# Patient Record
Sex: FEMALE | Race: AMERICAN INDIAN OR ALASKA NATIVE | NOT HISPANIC OR LATINO | Employment: STUDENT | URBAN - METROPOLITAN AREA
[De-identification: names, ages, dates, MRNs, and addresses within clinical notes are randomized per-mention and may not be internally consistent; named-entity substitution may affect disease eponyms.]

---

## 2017-01-03 ENCOUNTER — OFFICE VISIT (OUTPATIENT)
Dept: OPTOMETRY | Facility: CLINIC | Age: 22
End: 2017-01-03
Payer: COMMERCIAL

## 2017-01-03 ENCOUNTER — APPOINTMENT (OUTPATIENT)
Dept: OPTOMETRY | Facility: CLINIC | Age: 22
End: 2017-01-03
Payer: COMMERCIAL

## 2017-01-03 DIAGNOSIS — H52.13 MYOPIA, BILATERAL: Primary | ICD-10-CM

## 2017-01-03 DIAGNOSIS — H52.203 ASTIGMATISM, BILATERAL: ICD-10-CM

## 2017-01-03 PROCEDURE — 92015 DETERMINE REFRACTIVE STATE: CPT | Performed by: OPTOMETRIST

## 2017-01-03 PROCEDURE — 92340 FIT SPECTACLES MONOFOCAL: CPT | Performed by: OPTOMETRIST

## 2017-01-03 PROCEDURE — 92004 COMPRE OPH EXAM NEW PT 1/>: CPT | Performed by: OPTOMETRIST

## 2017-01-03 RX ORDER — FAMOTIDINE 20 MG/1
20 TABLET, FILM COATED ORAL
COMMUNITY
Start: 2016-12-15 | End: 2017-06-22

## 2017-01-03 ASSESSMENT — REFRACTION_MANIFEST
OS_CYLINDER: +0.50
OD_SPHERE: -1.50
OD_AXIS: 025
OS_SPHERE: -1.25
OS_AXIS: 125
OD_CYLINDER: +0.75

## 2017-01-03 ASSESSMENT — CONF VISUAL FIELD
OS_NORMAL: 1
OD_NORMAL: 1
METHOD: COUNTING FINGERS

## 2017-01-03 ASSESSMENT — VISUAL ACUITY
OS_SC: 20/20
METHOD: SNELLEN - LINEAR
OD_SC: 20/20
OS_SC+: -1
OS_SC: 20/20
OD_SC: 20/30

## 2017-01-03 ASSESSMENT — TONOMETRY
OS_IOP_MMHG: 15
OD_IOP_MMHG: 16
IOP_METHOD: TONOPEN

## 2017-01-03 ASSESSMENT — SLIT LAMP EXAM - LIDS
COMMENTS: NORMAL
COMMENTS: NORMAL

## 2017-01-03 ASSESSMENT — EXTERNAL EXAM - LEFT EYE: OS_EXAM: NORMAL

## 2017-01-03 ASSESSMENT — EXTERNAL EXAM - RIGHT EYE: OD_EXAM: NORMAL

## 2017-01-03 NOTE — MR AVS SNAPSHOT
After Visit Summary   1/3/2017    Anjelica Ocampo    MRN: [id number]           Patient Information     Date Of Birth          1995        Visit Information        Provider Department      1/3/2017 11:20 AM Pollo Cardoza, LIS [facility]        Today's Diagnoses     Myopia, bilateral    -  1     Astigmatism, bilateral           Care Instructions    Recommend new glasses.    Return in 1 year for a complete eye exam or sooner if needed.    Pollo Cardoza, OD    The affects of the dilating drops last for 4- 6 hours.  You will be more sensitive to light and vision will be blurry up close.  Mydriatic sunglasses were given if needed.            Follow-ups after your visit        Your next 10 appointments already scheduled     Jan 03, 2017  1:15 PM   New Visit with [facility]   [facility] ([facility]    [address] [place] [id number] [phone]              Who to contact     If you have questions or need follow up information about today's clinic visit or your schedule please contact [facility] directly at [phone].  Normal or non-critical lab and imaging results will be communicated to you by MyChart, letter or phone within 4 business days after the clinic has received the results. If you do not hear from us within 7 days, please contact the clinic through [employer] or phone. If you have a critical or abnormal lab result, we will notify you by phone as soon as possible.  Submit refill requests through [employer] or call your pharmacy and they will forward the refill request to us. Please allow 3 business days for your refill to be completed.          Additional Information About Your Visit        MyChart Information     [employer] lets you send messages to your doctor, view your test results, renew your prescriptions, schedule appointments and more. To sign up, go to [email or web address] .  "Click on \"Log in\" on the left side of the screen, which will take you to the Welcome page. Then click on \"Sign up Now\" on the right side of the page.     You will be asked to enter the access code listed below, as well as some personal information. Please follow the directions to create your username and password.     Your access code is: [email or web address]  Expires: 2017  4:14 PM     Your access code will  in 90 days. If you need help or a new code, please call your [place] clinic or [phone].        Care EveryWhere ID     This is your Care EveryWhere ID. This could be used by other organizations to access your [place] medical records  [id number]        Your Vitals Were     Last Period                   (LMP Unknown)            Blood Pressure from Last 3 Encounters:   10/09/16 122/71   01/10/14 114/72   13 112/72    Weight from Last 3 Encounters:   10/09/16 62.596 kg (138 lb)   01/10/14 54.091 kg (119 lb 4 oz) (42.90 %*)   13 58.968 kg (130 lb) (65.71 %*)     * Growth percentiles are based on [facility] 2-20 Years data.              We Performed the Following     EYE EXAM (SIMPLE-NONBILLABLE)     REFRACTION        Primary Care Provider Office Phone # Fax #    Debora Reza [phone]       XXX RETIRED XXX [address]  [facility] [address]        Thank you!     Thank you for choosing [facility]  for your care. Our goal is always to provide you with excellent care. Hearing back from our patients is one way we can continue to improve our services. Please take a few minutes to complete the written survey that you may receive in the mail after your visit with us. Thank you!             Your Updated Medication List - Protect others around you: Learn how to safely use, store and throw away your medicines at www.disposemymeds.org.          This list is accurate as of: 1/3/17  1:14 PM.  Always use your most recent med list.                   Brand Name Dispense " Instructions for use    [employer] PRENATAL GUMMY PO      Take by mouth daily       famotidine 20 MG tablet    PEPCID     Take 20 mg by mouth

## 2017-01-03 NOTE — PROGRESS NOTES
Chief Complaint   Patient presents with     COMPREHENSIVE EYE EXAM      Accompanied by mother and Accompanied by brother  Last Eye Exam: 2014  Dilated Previously: Yes    What are you currently using to see?  does not use glasses or contacts       Distance Vision Acuity: Noticed gradual change in both eyes    Near Vision Acuity: Not satisfied     Eye Comfort: good  Do you use eye drops? : No  Occupation or Hobbies: none    Salina Grullon Optometric Assistant, A.B.O.C.         Medical, surgical and family histories reviewed and updated 1/3/2017.       OBJECTIVE: See Ophthalmology exam    ASSESSMENT:    ICD-10-CM    1. Myopia, bilateral H52.13 EYE EXAM (SIMPLE-NONBILLABLE)     REFRACTION   2. Astigmatism, bilateral H52.203 EYE EXAM (SIMPLE-NONBILLABLE)     REFRACTION      PLAN:     Patient Instructions   Recommend new glasses.    Return in 1 year for a complete eye exam or sooner if needed.    Pollo Cardoza, OD

## 2017-01-03 NOTE — PATIENT INSTRUCTIONS
Recommend new glasses.    Return in 1 year for a complete eye exam or sooner if needed.    Pollo Cardoza, OD    The affects of the dilating drops last for 4- 6 hours.  You will be more sensitive to light and vision will be blurry up close.  Mydriatic sunglasses were given if needed.

## 2017-06-22 ENCOUNTER — OFFICE VISIT (OUTPATIENT)
Dept: URGENT CARE | Facility: URGENT CARE | Age: 22
End: 2017-06-22
Payer: COMMERCIAL

## 2017-06-22 VITALS
OXYGEN SATURATION: 99 % | DIASTOLIC BLOOD PRESSURE: 64 MMHG | SYSTOLIC BLOOD PRESSURE: 121 MMHG | TEMPERATURE: 98.1 F | HEART RATE: 72 BPM | BODY MASS INDEX: 25.81 KG/M2 | WEIGHT: 148 LBS

## 2017-06-22 DIAGNOSIS — R51.9 ACUTE INTRACTABLE HEADACHE, UNSPECIFIED HEADACHE TYPE: Primary | ICD-10-CM

## 2017-06-22 DIAGNOSIS — R11.14 BILIOUS VOMITING WITH NAUSEA: ICD-10-CM

## 2017-06-22 DIAGNOSIS — Z32.01 PREGNANCY TEST POSITIVE: ICD-10-CM

## 2017-06-22 DIAGNOSIS — G56.22 NEURITIS OF LEFT ULNAR NERVE: ICD-10-CM

## 2017-06-22 PROCEDURE — 99215 OFFICE O/P EST HI 40 MIN: CPT | Performed by: PHYSICIAN ASSISTANT

## 2017-06-22 NOTE — MR AVS SNAPSHOT
"              After Visit Summary   2017    Anjelica Ocampo    MRN: [id number]           Patient Information     Date Of Birth          1995        Visit Information        Provider Department      2017 6:05 PM Cheli Whitehead PA-C [facility]        Today's Diagnoses     Acute intractable headache, unspecified headache type    -  1    Bilious vomiting with nausea        Pregnancy test positive        Neuritis of left ulnar nerve           Follow-ups after your visit        Who to contact     If you have questions or need follow up information about today's clinic visit or your schedule please contact [facility] directly at [phone].  Normal or non-critical lab and imaging results will be communicated to you by MyChart, letter or phone within 4 business days after the clinic has received the results. If you do not hear from us within 7 days, please contact the clinic through [employer] or phone. If you have a critical or abnormal lab result, we will notify you by phone as soon as possible.  Submit refill requests through [employer] or call your pharmacy and they will forward the refill request to us. Please allow 3 business days for your refill to be completed.          Additional Information About Your Visit        MyChart Information     [employer] lets you send messages to your doctor, view your test results, renew your prescriptions, schedule appointments and more. To sign up, go to [email or web address] . Click on \"Log in\" on the left side of the screen, which will take you to the Welcome page. Then click on \"Sign up Now\" on the right side of the page.     You will be asked to enter the access code listed below, as well as some personal information. Please follow the directions to create your username and password.     Your access code is: [email or web address]  Expires: 2017  7:54 PM     Your access code will  in 90 days. If you need help " or a new code, please call your [place] clinic or [phone].        Care EveryWhere ID     This is your Care EveryWhere ID. This could be used by other organizations to access your [place] medical records  [id number]        Your Vitals Were     Pulse Temperature Last Period Pulse Oximetry Breastfeeding? BMI (Body Mass Index)    72 98.1  F (36.7  C) (Oral) (LMP Unknown) 99% No 25.81 kg/m2       Blood Pressure from Last 3 Encounters:   06/22/17 121/64   10/09/16 122/71   01/10/14 114/72    Weight from Last 3 Encounters:   06/22/17 148 lb (67.1 kg)   10/09/16 138 lb (62.6 kg)   01/10/14 119 lb 4 oz (54.1 kg) (43 %)*     * Growth percentiles are based on [facility] 2-20 Years data.              Today, you had the following     No orders found for display       Primary Care Provider Office Phone # Fax #    Debora Reza [phone]       XXX RETIRED XXX [address]  [place] [address]        Equal Access to Services     [facility] : Hadii cyn camarena Soac, waaxda lualissa, qaybta kaalru bess, pamella lucas . So [facility] [phone].    ATENCIÓN: Si habla español, tiene a cardoso disposición servicios gratuitos de asistencia lingüística. [facility] [phone].    We comply with applicable federal civil rights laws and [place] laws. We do not discriminate on the basis of race, color, national origin, age, disability sex, sexual orientation or gender identity.            Thank you!     Thank you for choosing [facility]  for your care. Our goal is always to provide you with excellent care. Hearing back from our patients is one way we can continue to improve our services. Please take a few minutes to complete the written survey that you may receive in the mail after your visit with us. Thank you!             Your Updated Medication List - Protect others around you: Learn how to safely use, store and throw away your medicines at  www.disposemymeds.org.      Notice  As of 6/22/2017  7:54 PM    You have not been prescribed any medications.

## 2017-06-22 NOTE — PROGRESS NOTES
SUBJECTIVE:                                                    Anjelica Ocampo is a 21 year old female who presents to clinic today for the following health issues:    Headaches      Duration: 2-3 weeks    Description  Location: left side of head  Character: burning sensation  Frequency:    Duration:      Intensity:  moderate    Accompanying signs and symptoms: sleeping less, fatigue, exhaustion, lightheaded    Precipitating or Alleviating factors: pt states that she took an at-home pregnancy test and it came back positive.   Nausea/vomiting: YES  Dizziness: usually  Weakness or numbness: no  Visual changes: none  Fever: no   Sinus or URI symptoms no     History  Head trauma: no   Family history of migraines: YES- mother.   Previous tests for headaches: no   Neurologist evaluations: no   Able to do daily activities when headache present: no   Wake with headaches: YES  Daily pain medication use: no   Any changes in:     Precipitating or Alleviating factors (light/sound/sleep/caffeine): light    Therapies tried and outcome: ibuprofen    Outcome - little relief.  Frequent/daily pain medication use: no       SUBJECTIVE:  Anjelica Ocampo is a 21 year old female who comes in for evaluation of headache.     DESCRIPTION OF HEADACHE:     Duration: 2-3 weeks    Description  Location: left side of head  Character: burning sensation  Frequency:    Duration:      Intensity:  moderate    Accompanying signs and symptoms: sleeping less, fatigue, exhaustion, lightheaded  Time from onset to maximum pain 1.5 weeks   This is the worst headache she has ever had.     Started with headache and vomiting 5-6 times/day.  Yellow foamy vomiting.  No abdominal pain. Normally does not get headaches.   Took a positive pregnancy test last week, not sure of gestational ago.  Planning to schedule first OB visit soon.   She has cold and sweats, burning on left side of head.  Numbness in right and small fingers when she stands up  She  is sleeping a lot. Has child at home who is 4 months old, so sleep is challenge.      Recent change in drug, medication or caffeine use: NO    Fever, myalgias, or neck stiffness: YES: body aches and weakness    Altered mental status, weakness, facial droop, or slurred speech: YES: slurring and mixing her words up, mumbling   Dizziness: YES    Trauma: NO    Multiple family members with headache or morning headache: NO    Exposure to carbon monoxide? NO   Anticoagulants or clotting disorder: NO    Jaw claudication or temple pain: YES: and face feels swollen    Recent exercise or chiropractic adjustment: NO    Pregnant or postpartum: YES    Aura or precipitants: none   Change in smell: YES    Change in vision: YES    Accompanying symptoms: nausea, vomiting, sonophobia, photophobia and paresthesias bilateral fingers.      History of headaches: Rarely, associated with astigmatism   Are most headaches similar in presentation? NO    HA medications:   Abortive meds? NSAIDs (ibuprofen)    Daily use? NO   Prophylactic meds? None      Past Medical History:   Diagnosis Date     Chronic gastritis      No current outpatient prescriptions on file.     Social History   Substance Use Topics     Smoking status: Passive Smoke Exposure - Never Smoker     Smokeless tobacco: Never Used      Comment: mom smokes      Alcohol use 0.5 oz/week     1 Shots of liquor per week     ROS:  As in HPI    OBJECTIVE:  /64 (BP Location: Left arm, Patient Position: Chair, Cuff Size: Adult Regular)  Pulse 72  Temp 98.1  F (36.7  C) (Oral)  Wt 148 lb (67.1 kg)  LMP  (LMP Unknown)  SpO2 99%  Breastfeeding? No  BMI 25.81 kg/m2    Exam:  CRANIAL NERVES:  Pupils equal and reacting to light  EYES: Normal  EOMI  Normal face sensation  Normal corneal reflex  Normal face strength and symmetry  Hearing - slightly diminished on left   Normal swallowing  Uvula midline  Full trapezius / sternocleidomastoid strength  Normal tongue protrusion     Neurologic  Exam:  Gait: Normal. Intact toe-heel walk  Strength: 5/5 deltoid, biceps, triceps, , hip flexion, knee flexion, dorsiflexion, plantarflexion  Visual fields intact: right, left and bilateral  Pronator drift: negative  Rapid hand movements intact  Finger to nose intact  DTR equal bilaterally: biceps, triceps, patellar, achilles  Sensation intact toes and shoulders    GENERAL APPEARANCE: healthy, alert and no distress  HENT: TM fluid bilateral, grey and translucent   NECK: supple, nontender, no lymphadenopathy  RESP: lungs clear to auscultation - no rales, rhonchi or wheezes  CV: regular rates and rhythm, normal S1 S2, no murmur noted  ABDOMEN:  soft, nontender, no masses  SKIN: no suspicious lesions or rashes  EXTREMITIES: positive ulnar Tinel's left, negative right.  Equivocal ulnar scratch test left, negative right.  Decreased sensation bilateral small and ring fingers.       ASSESSMENT/PLAN:  1. Acute intractable headache, unspecified headache type  2. Bilious vomiting with nausea  3. Pregnancy test positive  4. Neuritis of left ulnar nerve    - I am concerned with the slurred speech and bilious vomiting.   - I sent her to the emergency room for further evaluation.  I called [facility] with a handoff.     Cheli Whitehead PA-C

## 2017-06-22 NOTE — NURSING NOTE
"Chief Complaint   Patient presents with     Headache     Pt c/o headaches and vomiting for 2-3 weeks.        Initial /64 (BP Location: Left arm, Patient Position: Chair, Cuff Size: Adult Regular)  Pulse 72  Temp 98.1  F (36.7  C) (Oral)  Wt 148 lb (67.1 kg)  LMP  (LMP Unknown)  SpO2 99%  Breastfeeding? No  BMI 25.81 kg/m2 Estimated body mass index is 25.81 kg/(m^2) as calculated from the following:    Height as of 1/10/14: 5' 3.5\" (1.613 m).    Weight as of this encounter: 148 lb (67.1 kg).  Medication Reconciliation: complete     Patria New CMA (AAMA)      "

## 2017-09-23 ENCOUNTER — HEALTH MAINTENANCE LETTER (OUTPATIENT)
Age: 22
End: 2017-09-23

## 2017-10-30 LAB
HBV SURFACE AG SERPL QL IA: NONREACTIVE
HIV 1+2 AB+HIV1 P24 AG SERPL QL IA: NONREACTIVE
RUBELLA ABY IGG: NORMAL

## 2017-12-12 LAB — GLU GEST SCREEN 1HR 50G: 138

## 2017-12-26 LAB — HEMOGLOBIN: 10.7 G/DL (ref 11.7–15.7)

## 2017-12-29 LAB — GROUP B STREP PCR: NEGATIVE

## 2017-12-30 ENCOUNTER — HEALTH MAINTENANCE LETTER (OUTPATIENT)
Age: 22
End: 2017-12-30

## 2018-01-09 ENCOUNTER — HOSPITAL ENCOUNTER (OUTPATIENT)
Facility: CLINIC | Age: 23
Discharge: HOME | End: 2018-01-09
Attending: OBSTETRICS & GYNECOLOGY | Admitting: OBSTETRICS & GYNECOLOGY
Payer: COMMERCIAL

## 2018-01-09 VITALS
RESPIRATION RATE: 16 BRPM | SYSTOLIC BLOOD PRESSURE: 120 MMHG | BODY MASS INDEX: 26.63 KG/M2 | DIASTOLIC BLOOD PRESSURE: 69 MMHG | HEIGHT: 64 IN | TEMPERATURE: 97.8 F | WEIGHT: 156 LBS

## 2018-01-09 DIAGNOSIS — Z36.89 ENCOUNTER FOR TRIAGE IN PREGNANT PATIENT: Primary | ICD-10-CM

## 2018-01-09 LAB
A1 MICROGLOB PLACENTAL VAG QL: NEGATIVE
ALBUMIN UR-MCNC: 30 MG/DL
APPEARANCE UR: ABNORMAL
BACTERIA #/AREA URNS HPF: ABNORMAL /HPF
BILIRUB UR QL STRIP: NEGATIVE
C TRACH DNA SPEC QL NAA+PROBE: NEGATIVE
COLOR UR AUTO: YELLOW
GLUCOSE UR STRIP-MCNC: NEGATIVE MG/DL
HGB UR QL STRIP: NEGATIVE
KETONES UR STRIP-MCNC: 5 MG/DL
LEUKOCYTE ESTERASE UR QL STRIP: ABNORMAL
MUCOUS THREADS #/AREA URNS LPF: PRESENT /LPF
N GONORRHOEA DNA SPEC QL NAA+PROBE: NEGATIVE
NITRATE UR QL: NEGATIVE
PH UR STRIP: 6 PH (ref 5–7)
RBC #/AREA URNS AUTO: 3 /HPF (ref 0–2)
SOURCE: ABNORMAL
SP GR UR STRIP: 1.02 (ref 1–1.03)
SPECIMEN SOURCE: NORMAL
SQUAMOUS #/AREA URNS AUTO: 19 /HPF (ref 0–1)
TRANS CELLS #/AREA URNS HPF: 2 /HPF (ref 0–1)
UROBILINOGEN UR STRIP-MCNC: NORMAL MG/DL (ref 0–2)
WBC #/AREA URNS AUTO: 137 /HPF (ref 0–2)
WET PREP SPEC: NORMAL

## 2018-01-09 PROCEDURE — 87491 CHLMYD TRACH DNA AMP PROBE: CPT | Performed by: STUDENT IN AN ORGANIZED HEALTH CARE EDUCATION/TRAINING PROGRAM

## 2018-01-09 PROCEDURE — 87591 N.GONORRHOEAE DNA AMP PROB: CPT | Performed by: STUDENT IN AN ORGANIZED HEALTH CARE EDUCATION/TRAINING PROGRAM

## 2018-01-09 PROCEDURE — G0463 HOSPITAL OUTPT CLINIC VISIT: HCPCS

## 2018-01-09 PROCEDURE — 84112 EVAL AMNIOTIC FLUID PROTEIN: CPT | Performed by: OBSTETRICS & GYNECOLOGY

## 2018-01-09 PROCEDURE — 87086 URINE CULTURE/COLONY COUNT: CPT | Performed by: OBSTETRICS & GYNECOLOGY

## 2018-01-09 PROCEDURE — 81001 URINALYSIS AUTO W/SCOPE: CPT | Performed by: OBSTETRICS & GYNECOLOGY

## 2018-01-09 PROCEDURE — 87210 SMEAR WET MOUNT SALINE/INK: CPT | Performed by: STUDENT IN AN ORGANIZED HEALTH CARE EDUCATION/TRAINING PROGRAM

## 2018-01-09 RX ORDER — HYDROXYZINE PAMOATE 50 MG/1
50-100 CAPSULE ORAL
Qty: 10 CAPSULE | Refills: 0 | Status: SHIPPED | OUTPATIENT
Start: 2018-01-09 | End: 2022-11-06

## 2018-01-09 NOTE — PLAN OF CARE
Cervix unchanged upon recheck. Pt continues to have mild abdominal cramping, see flowsheets for FHR and contraction patterns. Prescription given for vistaril, to be picked up at hospital pharmacy by pt. Discharge instructions given with instructions on setting up clinic appointment with [facility] due to pt desiring to transfer care to deliver here. Pt denies needs, concerns, or questions at this time. Discharged ambulatory 0225.

## 2018-01-09 NOTE — IP AVS SNAPSHOT
MRN:[id number]                      After Visit Summary   1/9/2018    Anjelica Ocampo    MRN: [id number]           Thank you!     Thank you for choosing [facility] for your care. Our goal is always to provide you with excellent care. Hearing back from our patients is one way we can continue to improve our services. Please take a few minutes to complete the written survey that you may receive in the mail after you visit with us. Thank you!        Patient Information     Date Of Birth          1995        Designated Caregiver       Most Recent Value    Caregiver    Will someone help with your care after discharge? no      About your hospital stay     You were admitted on:  January 9, 2018 You last received care in the:  [facility]    You were discharged on:  January 9, 2018       Who to Call     For medical emergencies, please call 911.  For non-urgent questions about your medical care, please call your primary care provider or clinic, None          Attending Provider     Provider Shirley Shin MD OB/Gyn       Primary Care Provider Fax #    Physician No Ref-Primary [phone]      Further instructions from your care team       Discharge Instruction for Undelivered Patients      You were seen for: abdominal cramping / pain  We Consulted: Dr. Bolden and Dr. Johnson  You had (Test or Medicine): fetal and uterine monitoring, labs     Diet:   Drink 8 to 12 glasses of liquids (milk, juice, water) every day.  You may eat meals and snacks.     Activity:  Count fetal kicks everyday (see handout)  Call your doctor or nurse midwife if your baby is moving less than usual.     Call your provider if you notice:  Swelling in your face or increased swelling in your hands or legs.  Headaches that are not relieved by Tylenol (acetaminophen).  Changes in your vision (blurring: seeing spots or stars.)  Nausea (sick to your stomach) and vomiting (throwing up).   Weight gain of 5 pounds  "or more per week.  Heartburn that doesn't go away.  Signs of bladder infection: pain when you urinate (use the toilet), need to go more often and more urgently.  The bag of lozano (rupture of membranes) breaks, or you notice leaking in your underwear.  Bright red blood in your underwear.  Abdominal (lower belly) or stomach pain.  Second (plus) baby: Contractions (tightening) less than 10 minutes apart and getting stronger.  Increase or change in vaginal discharge (note the color and amount)      Follow-up:  Call to make clinic appointment with [facility] at [phone]          Pending Results     Date and Time Order Name Status Description    1/9/2018 0038 Chlamydia trachomatis PCR In process     1/9/2018 0038 Neisseria gonorrhoea PCR In process     1/9/2018 0035 Urine Culture Aerobic Bacterial In process             Statement of Approval     Ordered          01/09/18 0218  I have reviewed and agree with all the recommendations and orders detailed in this document.  EFFECTIVE NOW     Approved and electronically signed by:  Aida Bolden MD             Admission Information     Date & Time Provider Department Dept. Phone    1/9/2018 Shirley Johnson MD [facility] [phone]      Your Vitals Were     Blood Pressure Temperature Respirations Height Weight Last Period    120/69 97.8  F (36.6  C) (Oral) 16 1.613 m (5' 3.5\") 70.8 kg (156 lb) (LMP Unknown)    BMI (Body Mass Index)                   27.2 kg/m2           MyChart Information     [employer] lets you send messages to your doctor, view your test results, renew your prescriptions, schedule appointments and more. To sign up, go to [email or web address] . Click on \"Log in\" on the left side of the screen, which will take you to the Welcome page. Then click on \"Sign up Now\" on the right side of the page.     You will be asked to enter the access code listed below, as well as some personal information. Please follow the directions to " create your username and password.     Your access code is: [email or web address]  Expires: 2018  2:20 AM     Your access code will  in 90 days. If you need help or a new code, please call your [place] clinic or [phone].        Care EveryWhere ID     This is your Care EveryWhere ID. This could be used by other organizations to access your [place] medical records  [id number]        Equal Access to Services     [facility]: Hadii aad ku hadasho Soomaali, waaxda luqadaha, qaybta kaalmada adeegyada, waxay idiin hayaan adeeg stevetiffaniejuan ramon lucas . So [facility] [phone].    ATENCIÓN: Si habla español, tiene a cardoso disposición servicios gratuitos de asistencia lingüística. Nathanielame al [phone].    We comply with applicable federal civil rights laws and [place] laws. We do not discriminate on the basis of race, color, national origin, age, disability, sex, sexual orientation, or gender identity.               Review of your medicines      START taking        Dose / Directions    hydrOXYzine 50 MG capsule   Commonly known as:  VISTARIL        Dose:   mg   Take 1-2 capsules ( mg) by mouth nightly as needed (for sleep)   Quantity:  10 capsule   Refills:  0            Where to get your medicines      Some of these will need a paper prescription and others can be bought over the counter. Ask your nurse if you have questions.     Bring a paper prescription for each of these medications     hydrOXYzine 50 MG capsule                Protect others around you: Learn how to safely use, store and throw away your medicines at www.disposemymeds.org.             Medication List: This is a list of all your medications and when to take them. Check marks below indicate your daily home schedule. Keep this list as a reference.      Medications           Morning Afternoon Evening Bedtime As Needed    hydrOXYzine 50 MG capsule   Commonly known as:  VISTARIL   Take 1-2 capsules ( mg) by mouth nightly as needed (for sleep)

## 2018-01-09 NOTE — DISCHARGE INSTRUCTIONS
Discharge Instruction for Undelivered Patients      You were seen for: abdominal cramping / pain  We Consulted: Dr. Bolden and Dr. Johnson  You had (Test or Medicine): fetal and uterine monitoring, labs     Diet:   Drink 8 to 12 glasses of liquids (milk, juice, water) every day.  You may eat meals and snacks.     Activity:  Count fetal kicks everyday (see handout)  Call your doctor or nurse midwife if your baby is moving less than usual.     Call your provider if you notice:  Swelling in your face or increased swelling in your hands or legs.  Headaches that are not relieved by Tylenol (acetaminophen).  Changes in your vision (blurring: seeing spots or stars.)  Nausea (sick to your stomach) and vomiting (throwing up).   Weight gain of 5 pounds or more per week.  Heartburn that doesn't go away.  Signs of bladder infection: pain when you urinate (use the toilet), need to go more often and more urgently.  The bag of lozano (rupture of membranes) breaks, or you notice leaking in your underwear.  Bright red blood in your underwear.  Abdominal (lower belly) or stomach pain.  Second (plus) baby: Contractions (tightening) less than 10 minutes apart and getting stronger.  Increase or change in vaginal discharge (note the color and amount)      Follow-up:  Call to make clinic appointment with [facility] at [phone]

## 2018-01-09 NOTE — PLAN OF CARE
Data: Patient presented to [facility] at 0003 with complaints of uterine pain and possible leaking of fluid. Patient is a . Prenatal record reviewed.   Obstetric History       T0      L0     SAB1   TAB0   Ectopic0   Multiple0   Live Births0       # Outcome Date GA Lbr Vamsi/2nd Weight Sex Delivery Anes PTL Lv   3 Current            2             1 SAB               .  Medical History:   Past Medical History:   Diagnosis Date     Chronic gastritis    .  Gestational age 38w4d. Vital signs per doc flowsheet. Fetal movement present. Patient reports Rule Out Labor   as reason for admission. Support persons Levi present.  Action: Verbal consent for EFM, external fetal monitors applied. Admission assessment completed. Patient and support persons educated on labor process. Patient instructed to report change in fetal movement, contractions, vaginal leaking of fluid or bleeding, abdominal pain, or any concerns related to the pregnancy to her nurse/physician. Patient oriented to triage, call light in reach.   Response: Dr. Bolden informed of patient arrival. Plan per provider is send GC chlam, wet prep, urine, amnisure and SVE. SVE /-2, was 3.5 in the clinic. Recheck in 2 hours. Patient verbalized understanding of education and verbalized agreement with plan.    Report to VIN Vincent at 0106

## 2018-01-09 NOTE — PROGRESS NOTES
OB Triage Note    CC: Lower abdominal cramping    S: Anjelica Ocampo is a 22 year old  at 38w4d by 28w6d US who presents for lower abdominal cramping and contractions. Reports contractions are occurring every 10 minutes. She is able to talk through them. Reports leaking of clear fluid since yesterday. Reports normal vaginal discharge of pregnancy. Denies vaginal bleeding. Reports good fetal movement.      She received her prenatal care at [facility] but desires to deliver here and is transferring her care. She has not yet set up an appointment for follow up.     Cervix at last check at [facility] was 3cm per patient.     Her pregnancy has been complicated by:   1. Positive antibody screen- per [facility] records, antibody too weak to titer. She will need a type and screen on admission for labor   2. Late presentation for prenatal care  3. Failed GCT but did not complete GTT. Reports she was getting glucose checks in clinic and they have been normal.   4. Polyhydramnios (MANN 25.1). Growth US 37w4d- 6lb 13oz (45%ile)  5. Anemia- reports not taking Iron pills  6. History of homelessness and depression noted in chart, patient denies these.     Prenatal labs (Care Everywhere)  Rh positive, positive antibody screen (antibody to weak to titer)  GBS negative  GCT- failed, did not get GTT  Genetics- presented too late for screening  RPR, Hep B, HIV non-reactive  Rubella immune  Gonorrhea/Chlamydia negative  Pap 10/30/2017: NILM    OB history: ; reports  x1    Past Medical History:   Diagnosis Date     Chronic gastritis      Past Surgical History:   Procedure Laterality Date     NO HISTORY OF SURGERY       Medications  None     No Known Allergies    Objective:  Vitals:    18 0022   BP: 120/69   Temp: 97.8  F (36.6  C)   TempSrc: Oral     General: alert, comfortable, NAD  CV: regular rate and rhythm, no murmurs noted  Lungs: clear bilaterally, no crackles or wheezes  Abdomen: soft, gravid, non-tender  Extremities:  non-tender, trace edema  SSE: normal external genitalia, vaginal walls pink, cervix with normal discharge  SVE: 4/50/-2    FHT: baseline 125, moderate variability, + accelerations. One very subtle early vs late deceleration.  She was observed for half an hour with no further decelerations.   Chestnut: 1 ctx in 10 minutes    Labs/imaging:   Component      Latest Ref Rng & Units 2018          12:30 AM 12:30 AM 12:30 AM   Specimen Description            Wet Prep       No Trichomonas seen No clue cells seen No yeast seen     Component      Latest Ref Rng & Units 2018   Color Urine       Yellow   Appearance Urine       Slightly Cloudy   Glucose Urine      NEG:Negative mg/dL Negative   Bilirubin Urine      NEG:Negative Negative   Ketones Urine      NEG:Negative mg/dL 5 (A)   Specific Gravity Urine      1.003 - 1.035 1.025   Blood Urine      NEG:Negative Negative   pH Urine      5.0 - 7.0 pH 6.0   Protein Albumin Urine      NEG:Negative mg/dL 30 (A)   Urobilinogen mg/dL      0.0 - 2.0 mg/dL Normal   Nitrite Urine      NEG:Negative Negative   Leukocyte Esterase Urine      NEG:Negative Large (A)   Source       Midstream Urine   WBC Urine      0 - 2 / (H)   RBC Urine      0 - 2 /HPF 3 (H)   Bacteria Urine      NEG:Negative /HPF Moderate (A)   Squamous Epithelial /HPF Urine      0 - 1 /HPF 19 (H)   Transitional Epi      0 - 1 /HPF 2 (H)   Mucous Urine      NEG:Negative /LPF Present (A)     Assessment/Plan: 22 year old  at 38w4d by 28w6d US presenting for contractions. Cervix 4/50/-2, unchanged after 2hr re-check. Category 1 FHT overall (one subtle early vs late deceleration which did not recur with continued monitoring). Vital signs stable, physical exam unremarkable. Wet prep negative for infection. UA with moderate bacteria and leukocyte esterase, however many squamous cells and dirty sample. Since she is asymptomatic, will only treat if culture is positive (pending at the time of  discharge). She was reassured that she is not in labor. Labor precautions were discussed. She was given a prescription for vistaril for sleep.     She was given the phone number to call to make an appointment to establish care with [facility].    Patient seen and care plan discussed under supervision of Dr. Johnson.     Aida Bolden MD   Resident Physician, PGY2  Obstetrics, Gynecology, and Women's Health    The patient was reviewed with Dr. Bolden.  I agree with the above assessment and plan of care.    Shirley Johnson MD, FACOG

## 2018-01-09 NOTE — IP AVS SNAPSHOT
[facility]    [address]    [place] [address]    Phone:  [phone]                                       After Visit Summary   1/9/2018    Anjelica Ocampo    MRN: [id number]           After Visit Summary Signature Page     I have received my discharge instructions, and my questions have been answered. I have discussed any challenges I see with this plan with the nurse or doctor.    ..........................................................................................................................................  Patient/Patient Representative Signature      ..........................................................................................................................................  Patient Representative Print Name and Relationship to Patient    ..................................................               ................................................  Date                                            Time    ..........................................................................................................................................  Reviewed by Signature/Title    ...................................................              ..............................................  Date                                                            Time

## 2018-01-10 LAB
BACTERIA SPEC CULT: NO GROWTH
SPECIMEN SOURCE: NORMAL

## 2019-02-19 ENCOUNTER — ALLIED HEALTH/NURSE VISIT (OUTPATIENT)
Dept: NURSING | Facility: CLINIC | Age: 24
End: 2019-02-19
Payer: COMMERCIAL

## 2019-02-19 DIAGNOSIS — Z11.1 SCREENING EXAMINATION FOR PULMONARY TUBERCULOSIS: Primary | ICD-10-CM

## 2019-02-19 PROCEDURE — 86580 TB INTRADERMAL TEST: CPT

## 2019-02-19 NOTE — PROGRESS NOTES
The patient is asked the following questions today and these are her answers:    -Have you had a mantoux administered in the past 30 days?    No  -Have you had a previous positive Mantoux.  No  -Have you received BCG in the past.  No  -Have you had a live vaccine  (MMR, Varicella, OPV, Yellow Fever) in the last 6 weeks.  No  -Have you had and active  viral or bacterial infection in the past 6 weeks.  No  -Have you received corticosteroids or immunosuppressive agents in the past 6 weeks.  No  -Have you been diagnosed with HIV?  No  -Do you have a maglinancy?  No       Celeste Vigil MA

## 2019-02-21 ENCOUNTER — ALLIED HEALTH/NURSE VISIT (OUTPATIENT)
Dept: NURSING | Facility: CLINIC | Age: 24
End: 2019-02-21
Payer: COMMERCIAL

## 2019-02-21 DIAGNOSIS — Z11.1 SCREENING EXAMINATION FOR PULMONARY TUBERCULOSIS: Primary | ICD-10-CM

## 2019-02-21 LAB
PPDINDURATION: 0 MM (ref 0–5)
PPDREDNESS: 0 MM

## 2019-02-21 PROCEDURE — 99207 ZZC NO CHARGE NURSE ONLY: CPT

## 2019-02-21 NOTE — PROGRESS NOTES
Mantoux result:  Lab Results   Component Value Date    PPDREDNESS 0 02/21/2019    PPDINDURATIO 0 02/21/2019     Mantoux results: No induration.  No swelling.  No redness.    Houston Wu RN

## 2021-02-15 ENCOUNTER — NURSE TRIAGE (OUTPATIENT)
Dept: NURSING | Facility: CLINIC | Age: 26
End: 2021-02-15

## 2021-02-16 ENCOUNTER — VIRTUAL VISIT (OUTPATIENT)
Dept: FAMILY MEDICINE | Facility: CLINIC | Age: 26
End: 2021-02-16
Payer: COMMERCIAL

## 2021-02-16 DIAGNOSIS — Z53.9 NO SHOW: Primary | ICD-10-CM

## 2021-02-16 PROCEDURE — 99207 PR NO SHOW FOR SCHEDULED APPT: CPT | Performed by: NURSE PRACTITIONER

## 2021-02-16 NOTE — TELEPHONE ENCOUNTER
Patient says she has been debating on if she should be seen.  Patient reports she is freezing and sweating at the same time.  Her skin feels sensitive to the touch and she has pain all over.  Patient says heart seems to be racing.  Heart rate during call is 84. Patient does not have a thermometer. Reviewed care advice with caller per RN triage protocol guideline to be evaluated tomorrow via virtual visit.  Caller verbalized understanding and agrees with plan.      COVID 19 Nurse Triage Plan/Patient Instructions    Please be aware that novel coronavirus (COVID-19) may be circulating in the community. If you develop symptoms such as fever, cough, or SOB or if you have concerns about the presence of another infection including coronavirus (COVID-19), please contact your health care provider or visit [email or web address].     Disposition/Instructions    Virtual Visit with provider recommended. Reference Visit Selection Guide.    Thank you for taking steps to prevent the spread of this virus.  o Limit your contact with others.  o Wear a simple mask to cover your cough.  o Wash your hands well and often.    Resources    [facility]: About COVID-19: [email or web address]    CDC: What to Do If You're Sick: www.cdc.gov/coronavirus/2019-ncov/about/steps-when-sick.html    CDC: Ending Home Isolation: www.cdc.gov/coronavirus/2019-ncov/hcp/disposition-in-home-patients.html     CDC: Caring for Someone: www.cdc.gov/coronavirus/2019-ncov/if-you-are-sick/care-for-someone.html     [facility]: Interim Guidance for Hospital Discharge to Home: www.health.[facility].[place]/diseases/coronavirus/hcp/hospdischarge.pdf    [facility] clinical trials (COVID-19 research studies): [email or web address]-trials     Below are the COVID-19 hotlines at the [place] [facility] ([facility]). Interpreters are available.   o For health questions: Call 487-796-0922 or 1-418.279.3052 (7 a.m. to 7 p.m.)  o For questions about  schools and childcare: Call 459-189-8323 or 1-951.784.2788 (7 a.m. to 7 p.m.)                         Reason for Disposition    [1] COVID-19 infection suspected by caller or triager AND [2] mild symptoms (cough, fever, or others) AND [3] no complications or SOB    Additional Information    Negative: SEVERE difficulty breathing (e.g., struggling for each breath, speaks in single words)    Negative: Difficult to awaken or acting confused (e.g., disoriented, slurred speech)    Negative: Bluish (or gray) lips or face now    Negative: Shock suspected (e.g., cold/pale/clammy skin, too weak to stand, low BP, rapid pulse)    Negative: Sounds like a life-threatening emergency to the triager    Negative: SEVERE or constant chest pain or pressure (Exception: mild central chest pain, present only when coughing)    Negative: MODERATE difficulty breathing (e.g., speaks in phrases, SOB even at rest, pulse 100-120)    Negative: [1] Headache AND [2] stiff neck (can't touch chin to chest)    Negative: MILD difficulty breathing (e.g., minimal/no SOB at rest, SOB with walking, pulse <100)    Negative: Chest pain or pressure    Negative: Patient sounds very sick or weak to the triager    Negative: Fever > 103 F (39.4 C)    Negative: [1] Fever > 101 F (38.3 C) AND [2] age > 60    Negative: [1] Fever > 100.0 F (37.8 C) AND [2] bedridden (e.g., nursing home patient, CVA, chronic illness, recovering from surgery)    Negative: [1] HIGH RISK patient (e.g., age > 64 years, diabetes, heart or lung disease, weak immune system) AND [2] new or worsening symptoms    Negative: [1] HIGH RISK patient AND [2] influenza is widespread in the community AND [3] ONE OR MORE respiratory symptoms: cough, sore throat, runny or stuffy nose    Negative: [1] HIGH RISK patient AND [2] influenza exposure within the last 7 days AND [3] ONE OR MORE respiratory symptoms: cough, sore throat, runny or stuffy nose    Negative: Fever present > 3 days (72 hours)     Negative: [1] Fever returns after gone for over 24 hours AND [2] symptoms worse or not improved    Negative: [1] Continuous (nonstop) coughing interferes with work or school AND [2] no improvement using cough treatment per protocol    Protocols used: CORONAVIRUS (COVID-19) DIAGNOSED OR [id number] 1.3

## 2021-07-18 ENCOUNTER — APPOINTMENT (OUTPATIENT)
Dept: CT IMAGING | Facility: CLINIC | Age: 26
End: 2021-07-18
Payer: COMMERCIAL

## 2021-07-18 ENCOUNTER — HOSPITAL ENCOUNTER (EMERGENCY)
Facility: CLINIC | Age: 26
Discharge: HOME | End: 2021-07-19
Attending: EMERGENCY MEDICINE | Admitting: EMERGENCY MEDICINE
Payer: COMMERCIAL

## 2021-07-18 DIAGNOSIS — R41.82 ALTERED MENTAL STATUS, UNSPECIFIED ALTERED MENTAL STATUS TYPE: ICD-10-CM

## 2021-07-18 DIAGNOSIS — F12.90 MARIJUANA SMOKER: ICD-10-CM

## 2021-07-18 DIAGNOSIS — F39 EPISODIC MOOD DISORDER (H): ICD-10-CM

## 2021-07-18 LAB
ALBUMIN SERPL-MCNC: 4.2 G/DL (ref 3.4–5)
ALP SERPL-CCNC: 64 U/L (ref 40–150)
ALT SERPL W P-5'-P-CCNC: 22 U/L (ref 0–50)
ANION GAP SERPL CALCULATED.3IONS-SCNC: 3 MMOL/L (ref 3–14)
AST SERPL W P-5'-P-CCNC: 18 U/L (ref 0–45)
ATRIAL RATE - MUSE: 72 BPM
BASOPHILS # BLD AUTO: 0 10E3/UL (ref 0–0.2)
BASOPHILS NFR BLD AUTO: 0 %
BILIRUB SERPL-MCNC: 0.7 MG/DL (ref 0.2–1.3)
BUN SERPL-MCNC: 8 MG/DL (ref 7–30)
CALCIUM SERPL-MCNC: 8.6 MG/DL (ref 8.5–10.1)
CHLORIDE BLD-SCNC: 121 MMOL/L (ref 94–109)
CO2 SERPL-SCNC: 24 MMOL/L (ref 20–32)
CREAT SERPL-MCNC: 0.9 MG/DL (ref 0.52–1.04)
DIASTOLIC BLOOD PRESSURE - MUSE: NORMAL MMHG
EOSINOPHIL # BLD AUTO: 0 10E3/UL (ref 0–0.7)
EOSINOPHIL NFR BLD AUTO: 0 %
ERYTHROCYTE [DISTWIDTH] IN BLOOD BY AUTOMATED COUNT: 12.6 % (ref 10–15)
GFR SERPL CREATININE-BSD FRML MDRD: 90 ML/MIN/1.73M2
GLUCOSE BLD-MCNC: 103 MG/DL (ref 70–99)
GLUCOSE BLDC GLUCOMTR-MCNC: 93 MG/DL (ref 70–99)
HCG SERPL QL: NEGATIVE
HCT VFR BLD AUTO: 38.8 % (ref 35–47)
HGB BLD-MCNC: 12.9 G/DL (ref 11.7–15.7)
IMM GRANULOCYTES # BLD: 0.1 10E3/UL
IMM GRANULOCYTES NFR BLD: 0 %
INTERPRETATION ECG - MUSE: NORMAL
LYMPHOCYTES # BLD AUTO: 0.9 10E3/UL (ref 0.8–5.3)
LYMPHOCYTES NFR BLD AUTO: 6 %
MCH RBC QN AUTO: 29.5 PG (ref 26.5–33)
MCHC RBC AUTO-ENTMCNC: 33.2 G/DL (ref 31.5–36.5)
MCV RBC AUTO: 89 FL (ref 78–100)
MONOCYTES # BLD AUTO: 0.5 10E3/UL (ref 0–1.3)
MONOCYTES NFR BLD AUTO: 3 %
NEUTROPHILS # BLD AUTO: 14.1 10E3/UL (ref 1.6–8.3)
NEUTROPHILS NFR BLD AUTO: 91 %
NRBC # BLD AUTO: 0 10E3/UL
NRBC BLD AUTO-RTO: 0 /100
P AXIS - MUSE: 57 DEGREES
PLATELET # BLD AUTO: 294 10E3/UL (ref 150–450)
POTASSIUM BLD-SCNC: 3.6 MMOL/L (ref 3.4–5.3)
PR INTERVAL - MUSE: 154 MS
PROT SERPL-MCNC: 8.4 G/DL (ref 6.8–8.8)
QRS DURATION - MUSE: 86 MS
QT - MUSE: 382 MS
QTC - MUSE: 418 MS
R AXIS - MUSE: 52 DEGREES
RBC # BLD AUTO: 4.38 10E6/UL (ref 3.8–5.2)
SODIUM SERPL-SCNC: 148 MMOL/L (ref 133–144)
SYSTOLIC BLOOD PRESSURE - MUSE: NORMAL MMHG
T AXIS - MUSE: 38 DEGREES
TSH SERPL DL<=0.005 MIU/L-ACNC: 2.69 MU/L (ref 0.4–4)
VENTRICULAR RATE- MUSE: 72 BPM
WBC # BLD AUTO: 15.6 10E3/UL (ref 4–11)

## 2021-07-18 PROCEDURE — 250N000011 HC RX IP 250 OP 636: Performed by: STUDENT IN AN ORGANIZED HEALTH CARE EDUCATION/TRAINING PROGRAM

## 2021-07-18 PROCEDURE — 70450 CT HEAD/BRAIN W/O DYE: CPT

## 2021-07-18 PROCEDURE — 99285 EMERGENCY DEPT VISIT HI MDM: CPT | Mod: 25

## 2021-07-18 PROCEDURE — 36592 COLLECT BLOOD FROM PICC: CPT | Performed by: EMERGENCY MEDICINE

## 2021-07-18 PROCEDURE — 250N000013 HC RX MED GY IP 250 OP 250 PS 637: Performed by: EMERGENCY MEDICINE

## 2021-07-18 PROCEDURE — 85025 COMPLETE CBC W/AUTO DIFF WBC: CPT | Performed by: STUDENT IN AN ORGANIZED HEALTH CARE EDUCATION/TRAINING PROGRAM

## 2021-07-18 PROCEDURE — C9803 HOPD COVID-19 SPEC COLLECT: HCPCS

## 2021-07-18 PROCEDURE — 93005 ELECTROCARDIOGRAM TRACING: CPT

## 2021-07-18 PROCEDURE — 36415 COLL VENOUS BLD VENIPUNCTURE: CPT | Performed by: EMERGENCY MEDICINE

## 2021-07-18 PROCEDURE — 80053 COMPREHEN METABOLIC PANEL: CPT | Performed by: STUDENT IN AN ORGANIZED HEALTH CARE EDUCATION/TRAINING PROGRAM

## 2021-07-18 PROCEDURE — U0003 INFECTIOUS AGENT DETECTION BY NUCLEIC ACID (DNA OR RNA); SEVERE ACUTE RESPIRATORY SYNDROME CORONAVIRUS 2 (SARS-COV-2) (CORONAVIRUS DISEASE [COVID-19]), AMPLIFIED PROBE TECHNIQUE, MAKING USE OF HIGH THROUGHPUT TECHNOLOGIES AS DESCRIBED BY CMS-2020-01-R: HCPCS | Performed by: EMERGENCY MEDICINE

## 2021-07-18 PROCEDURE — 84703 CHORIONIC GONADOTROPIN ASSAY: CPT | Performed by: EMERGENCY MEDICINE

## 2021-07-18 PROCEDURE — 84443 ASSAY THYROID STIM HORMONE: CPT | Performed by: STUDENT IN AN ORGANIZED HEALTH CARE EDUCATION/TRAINING PROGRAM

## 2021-07-18 PROCEDURE — 250N000011 HC RX IP 250 OP 636: Performed by: EMERGENCY MEDICINE

## 2021-07-18 RX ORDER — HALOPERIDOL 5 MG/ML
10 INJECTION INTRAMUSCULAR ONCE
Status: COMPLETED | OUTPATIENT
Start: 2021-07-18 | End: 2021-07-18

## 2021-07-18 RX ORDER — ONDANSETRON 8 MG/1
8 TABLET, ORALLY DISINTEGRATING ORAL ONCE
Status: COMPLETED | OUTPATIENT
Start: 2021-07-18 | End: 2021-07-18

## 2021-07-18 RX ORDER — ONDANSETRON 4 MG/1
4 TABLET, ORALLY DISINTEGRATING ORAL ONCE
Status: DISCONTINUED | OUTPATIENT
Start: 2021-07-18 | End: 2021-07-18

## 2021-07-18 RX ORDER — HYDROXYZINE HYDROCHLORIDE 25 MG/1
50 TABLET, FILM COATED ORAL ONCE
Status: COMPLETED | OUTPATIENT
Start: 2021-07-18 | End: 2021-07-18

## 2021-07-18 RX ADMIN — HYDROXYZINE HYDROCHLORIDE 50 MG: 25 TABLET, FILM COATED ORAL at 17:32

## 2021-07-18 RX ADMIN — HALOPERIDOL LACTATE 10 MG: 5 INJECTION, SOLUTION INTRAMUSCULAR at 18:06

## 2021-07-18 RX ADMIN — ONDANSETRON 8 MG: 8 TABLET, ORALLY DISINTEGRATING ORAL at 17:26

## 2021-07-18 ASSESSMENT — ENCOUNTER SYMPTOMS
ABDOMINAL PAIN: 0
FEVER: 0
BACK PAIN: 0
SORE THROAT: 1
CHILLS: 0
HALLUCINATIONS: 0
NUMBNESS: 1
SHORTNESS OF BREATH: 0

## 2021-07-18 NOTE — ED NOTES
Patient at this time began to speak to RN and security, she was not coherent, words were coming out of patient's mouth, but she was unable to use her words to express what was occurring.  At this time she was walking towards the exit (straight, no issues with mobility) and attempting to leave. She was reminded that she is in the hospital and needs to be checked, but continued to attempt to leave the ED. At this time security brought patient to the ground for attempting to elope, and was brought into seclusion after shot was given to patient IM. She was standing at the door at this time with 1:1 present. She has taken off her clothing and has stripped naked, for patient modesty 1:1 will remain a female. Covering has been placed over windows to give patient modesty, but allows 1:1 to still observe patient.

## 2021-07-18 NOTE — ED NOTES
Within an hour after restraint an in person face to face assessment was completed at 6:19 PM, including an evaluation of the patient's immediate reaction to the intervention, behavioral assessment and review/assessment of history, drugs and medications, recent labs, etc., and behavioral condition.  The patient experienced: No adverse physical outcome from seclusion initiation.  The intervention of restraint or seclusion needs to continue.       Apoorva Suarez MD  Resident  07/18/21 [id number]
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
## 2021-07-19 VITALS
SYSTOLIC BLOOD PRESSURE: 137 MMHG | TEMPERATURE: 97 F | OXYGEN SATURATION: 99 % | DIASTOLIC BLOOD PRESSURE: 81 MMHG | WEIGHT: 141.7 LBS | BODY MASS INDEX: 24.71 KG/M2 | RESPIRATION RATE: 16 BRPM | HEART RATE: 79 BPM

## 2021-07-19 LAB
ALBUMIN UR-MCNC: NEGATIVE MG/DL
AMPHETAMINES UR QL SCN: ABNORMAL
APPEARANCE UR: ABNORMAL
BACTERIA #/AREA URNS HPF: ABNORMAL /HPF
BARBITURATES UR QL: ABNORMAL
BENZODIAZ UR QL: ABNORMAL
BILIRUB UR QL STRIP: NEGATIVE
CANNABINOIDS UR QL SCN: ABNORMAL
COCAINE UR QL: ABNORMAL
COLOR UR AUTO: ABNORMAL
ETHANOL UR QL SCN: ABNORMAL
GLUCOSE UR STRIP-MCNC: NEGATIVE MG/DL
HGB UR QL STRIP: ABNORMAL
KETONES UR STRIP-MCNC: 40 MG/DL
LEUKOCYTE ESTERASE UR QL STRIP: NEGATIVE
MUCOUS THREADS #/AREA URNS LPF: PRESENT /LPF
NITRATE UR QL: NEGATIVE
OPIATES UR QL SCN: ABNORMAL
PH UR STRIP: 5.5 [PH] (ref 5–7)
RBC URINE: 3 /HPF
RENAL TUB EPI: <1 /HPF
SARS-COV-2 RNA RESP QL NAA+PROBE: NEGATIVE
SP GR UR STRIP: 1.02 (ref 1–1.03)
SQUAMOUS EPITHELIAL: 7 /HPF
UROBILINOGEN UR STRIP-MCNC: NORMAL MG/DL
WBC URINE: 6 /HPF

## 2021-07-19 PROCEDURE — 81001 URINALYSIS AUTO W/SCOPE: CPT | Performed by: STUDENT IN AN ORGANIZED HEALTH CARE EDUCATION/TRAINING PROGRAM

## 2021-07-19 PROCEDURE — 80307 DRUG TEST PRSMV CHEM ANLYZR: CPT | Performed by: EMERGENCY MEDICINE

## 2021-07-19 PROCEDURE — 90791 PSYCH DIAGNOSTIC EVALUATION: CPT

## 2021-07-19 NOTE — ED NOTES
Pt at this time is calm, cooperative, and has allowed staff to do labs. Pt at this time is acknowledging that she will not leave room.

## 2021-07-19 NOTE — ED NOTES
Patient was calm/cooperative while doing labs (bloodwork). At this time she was not wanting to put clothes on, continued to take them off when helping to apply. RN covered patient with blankets. Patient was looking at this RN and saying words, but was not coherent. The words she was saying were out of place and did not make sense. She did not acknowledge this RN when asking yes/no questions. Patient is moving extremities equally in strength, unable to determine orientation level, strength is equal in hand , smile is normal with no facial droop. Dr. Keenan has been made aware of this observation, no new orders at this time.

## 2021-07-19 NOTE — ED NOTES
"Patient was woken up to ask to give permission to give sister information, she was able to state \"tony can know what is happening to me.\" Sister has been updated.   "

## 2021-07-19 NOTE — ED NOTES
Pt seen by the DEC  and the ED provider and Pt cleared to be discharge. Pt ate lunch just prior to being discharge.

## 2021-07-19 NOTE — ED NOTES
Pt is sleeping, was unable to talk to DEC  due to being unable to maintain focus and stay awake. This RN did try to get patient awake enough to have a conversation, but was unsuccessful.

## 2021-07-19 NOTE — ED NOTES
"Pt woke up calm and cooperative, asking to go to the bathroom and a phone to call her mom to let her know she is ok. Pt requesting to talk to a mental health  and states, \"I've been waiting to talk to mental health  for a few years now.\"   "

## 2021-07-19 NOTE — ED NOTES
Emergency Department Patient Sign-out     Brief HPI and ED course:  Patient is a 25 year old female signed out to me by Dr. Keenan.  See initial ED Provider note for details of the presentation. In brief, presented with leg numbness. While here had an abrupt behavior change and seemed altered. CT head negative. WBC 15. Code 21 and then 10 IM haldol given. DEC recommended monitoring overnight and re-assessment in the AM.     Vitals:   Patient Vitals for the past 24 hrs:   BP Temp Temp src Pulse Resp SpO2 Weight   07/18/21 2251 112/43 97  F (36.1  C) Oral 67 18 100 % --   07/18/21 1602 (!) 129/94 97.9  F (36.6  C) Oral 88 16 99 % 64.3 kg (141 lb 11.2 oz)     Received Sign-out Plan:    Pending studies include: AM reassessment by DEC      Plan:   - monitor overnight, re-DEC in the AM, likely admission     Events after assuming care:  No acute events overnight. Patient signed out to oncoming provider with plan for DEC re-assessment.      --  Obdulio Dela Cruz MD   Emergency Medicine       Obdulio Dela Cruz MD  07/19/21 [id number]

## 2021-07-19 NOTE — ED NOTES
Patient with seen in main ED overnight. She had presented with concerns for numbness yesterday evening. She got agitated while waiting to be seen and needed an injection of haldol. There was no available  overnight and she wanted to speak with one.    Patient has been calm and in emotional and behavioral control today. She denies thoughts of harm to self or others. She does not exhibit psychosis. She reports underlying stress and reacting poorly by being emotionally dysregulated. She has no psychiatric services in the community. She feels she would benefit from seeing a therapist.    Please see DEC Crisis Assessment on 7/19/21 in Epic for further details.    Patient is ready to be discharged. [facility] made a referral for therapy. She can be released. She plans on having her father pick her up.     Kraig Vernon MD  07/19/21 5544

## 2021-07-19 NOTE — DISCHARGE INSTRUCTIONS
Follow-up P-referred therapy to work on healthy coping and resilience  Follow-up [facility] care and services
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
## 2021-08-04 ENCOUNTER — TELEPHONE (OUTPATIENT)
Dept: MIDWIFE SERVICES | Facility: CLINIC | Age: 26
End: 2021-08-04
Payer: COMMERCIAL

## 2021-08-04 NOTE — TELEPHONE ENCOUNTER
Spoke with patient's mother. Asked her to tell patient to call writer back.     Upon call back please ask patient if she is interested in doing a nexplanon reinsertion. If so, we do not have a long enough time slot. Patient will need to come in 10-15 minutes earlier than appointment to try to get her started a little early.

## 2021-09-27 PROBLEM — O09.893 SHORT INTERVAL BETWEEN PREGNANCIES AFFECTING PREGNANCY IN THIRD TRIMESTER, ANTEPARTUM: Status: ACTIVE | Noted: 2018-01-11

## 2022-07-11 ENCOUNTER — OFFICE VISIT (OUTPATIENT)
Dept: URGENT CARE | Facility: URGENT CARE | Age: 27
End: 2022-07-11
Payer: COMMERCIAL

## 2022-07-11 VITALS
SYSTOLIC BLOOD PRESSURE: 108 MMHG | TEMPERATURE: 98.7 F | HEART RATE: 78 BPM | RESPIRATION RATE: 15 BRPM | BODY MASS INDEX: 25.69 KG/M2 | OXYGEN SATURATION: 99 % | WEIGHT: 145 LBS | HEIGHT: 63 IN | DIASTOLIC BLOOD PRESSURE: 72 MMHG

## 2022-07-11 DIAGNOSIS — R07.0 THROAT PAIN: Primary | ICD-10-CM

## 2022-07-11 DIAGNOSIS — J02.0 STREP THROAT: ICD-10-CM

## 2022-07-11 LAB — DEPRECATED S PYO AG THROAT QL EIA: POSITIVE

## 2022-07-11 PROCEDURE — 87880 STREP A ASSAY W/OPTIC: CPT | Performed by: PHYSICIAN ASSISTANT

## 2022-07-11 PROCEDURE — 99213 OFFICE O/P EST LOW 20 MIN: CPT | Performed by: PHYSICIAN ASSISTANT

## 2022-07-11 RX ORDER — AMOXICILLIN 500 MG/1
500 CAPSULE ORAL 2 TIMES DAILY
Qty: 20 CAPSULE | Refills: 0 | Status: SHIPPED | OUTPATIENT
Start: 2022-07-11 | End: 2022-07-11

## 2022-07-11 RX ORDER — AMOXICILLIN 400 MG/5ML
875 POWDER, FOR SUSPENSION ORAL 2 TIMES DAILY
Qty: 218 ML | Refills: 0 | Status: SHIPPED | OUTPATIENT
Start: 2022-07-11 | End: 2022-07-21

## 2022-07-11 ASSESSMENT — ENCOUNTER SYMPTOMS
CONSTITUTIONAL NEGATIVE: 1
RESPIRATORY NEGATIVE: 1
CARDIOVASCULAR NEGATIVE: 1
SORE THROAT: 1
ABDOMINAL PAIN: 1
EYES NEGATIVE: 1
MUSCULOSKELETAL NEGATIVE: 1

## 2022-07-11 NOTE — PROGRESS NOTES
SUBJECTIVE:   Anjelica Ocampo is a 26 year old female presenting with a chief complaint of   Chief Complaint   Patient presents with     Urgent Care     Pharyngitis     Sore throat and feeling sick x 2 days.        She is an established patient of [facility].    Anjelica is a 27 yo F who presents today with sore throat. Chills, night sweats, and had a tactile fever around 2 days ago. She physically feels better today, but throat is on the fire. The pain is rated 10/10 when swallowing. No cough, congestion and runny nose.  She took an at home COVID test which was negative.  No nausea or vomiting. Had some right chest pain yesterday. She denies having any pleuritic chest pain. Took two  Acetaminophen 500 mg last night which has not helped. 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 She also reports having RLQ pain as well. No diarrhea or constipation.      Review of Systems   Constitutional: Negative.    HENT: Positive for congestion, ear pain and sore throat.    Eyes: Negative.    Respiratory: Negative.    Cardiovascular: Negative.    Gastrointestinal: Positive for abdominal pain.   Genitourinary: Negative.    Musculoskeletal: Negative.    Skin: Negative.    All other systems reviewed and are negative.      Past Medical History:   Diagnosis Date     Chronic gastritis      Family History   Problem Relation Age of Onset     Thyroid Disease Mother      Diabetes Maternal Grandmother      Breast Cancer Maternal Grandmother      Cancer Maternal Grandmother      Cerebrovascular Disease Maternal Grandmother      Hypertension Other      Current Outpatient Medications   Medication Sig Dispense Refill     amoxicillin (AMOXIL) 400 MG/5ML suspension Take 10.9 mLs (875 mg) by mouth 2 times daily for 10 days 218 mL 0     hydrOXYzine (VISTARIL) 50 MG capsule Take 1-2 capsules ( mg) by mouth nightly as needed (for sleep) 10 capsule 0     Social History     Tobacco Use     Smoking status: Passive Smoke Exposure -  "Never Smoker     Smokeless tobacco: Never Used     Tobacco comment: mom smokes    Substance Use Topics     Alcohol use: Yes     Alcohol/week: 0.8 standard drinks     Types: 1 Shots of liquor per week       OBJECTIVE  /72   Pulse 78   Temp 98.7  F (37.1  C) (Temporal)   Resp 15   Ht 1.6 m (5' 3\")   Wt 65.8 kg (145 lb)   SpO2 99%   Breastfeeding No   BMI 25.69 kg/m      Physical Exam  Vitals and nursing note reviewed.   Constitutional:       Appearance: Normal appearance. She is normal weight.   HENT:      Head: Normocephalic and atraumatic.      Right Ear: Tympanic membrane, ear canal and external ear normal.      Left Ear: Tympanic membrane, ear canal and external ear normal.      Nose: Nose normal.      Mouth/Throat:      Mouth: Mucous membranes are moist.      Pharynx: Oropharyngeal exudate and posterior oropharyngeal erythema present.      Comments: Large tonsils  Eyes:      Extraocular Movements: Extraocular movements intact.      Conjunctiva/sclera: Conjunctivae normal.   Cardiovascular:      Rate and Rhythm: Normal rate and regular rhythm.      Pulses: Normal pulses.      Heart sounds: Normal heart sounds.   Pulmonary:      Effort: Pulmonary effort is normal.      Breath sounds: Normal breath sounds.   Abdominal:      General: Abdomen is flat. Bowel sounds are normal.      Palpations: Abdomen is soft.      Tenderness: There is abdominal tenderness.      Comments: Pain on palpation RLQ. Positive Rosving. Negative psoas and obturator sign. No pain RLQ when asked to jump up and down.    Musculoskeletal:      Cervical back: Normal range of motion.   Skin:     General: Skin is warm and dry.      Findings: No rash.   Neurological:      General: No focal deficit present.      Mental Status: She is alert.   Psychiatric:         Mood and Affect: Mood normal.         Behavior: Behavior normal.         Labs:  Results for orders placed or performed in visit on 07/11/22 (from the past 24 hour(s)) "   Streptococcus A Rapid Screen w/Reflex to PCR - Clinic Collect    Specimen: Throat; Swab   Result Value Ref Range    Group A Strep antigen Positive (A) Negative       X-Ray was not done.    ASSESSMENT:      ICD-10-CM    1. Throat pain  R07.0 Streptococcus A Rapid Screen w/Reflex to PCR - Clinic Collect     amoxicillin (AMOXIL) 400 MG/5ML suspension     DISCONTINUED: amoxicillin (AMOXIL) 500 MG capsule   2. Strep throat  J02.0 amoxicillin (AMOXIL) 400 MG/5ML suspension     DISCONTINUED: amoxicillin (AMOXIL) 500 MG capsule        Medical Decision Making:    Differential Diagnosis:  URI Adult/Peds: Strep, Mononucleosis, Viral pharyngitis, Viral syndrome and Viral upper respiratory illness    Serious Comorbid Conditions:  Adult:  reviewed    PLAN:    URI Adult:  Tylenol, Ibuprofen, Fluids, Rest and Rx strep Amoxicillin 875 mg. Patient advised to complete regimen.     Discussed reasons to seek immediate medical attention.  Additionally if no improvement or worsening in one week, may follow up with PCP and/or UC.        Followup:    If not improving or if conditions worsens over the next 12-24 hours, go to the Emergency Department    There are no Patient Instructions on file for this visit.

## 2022-11-06 ENCOUNTER — OFFICE VISIT (OUTPATIENT)
Dept: URGENT CARE | Facility: URGENT CARE | Age: 27
End: 2022-11-06
Payer: COMMERCIAL

## 2022-11-06 VITALS
OXYGEN SATURATION: 99 % | DIASTOLIC BLOOD PRESSURE: 68 MMHG | HEART RATE: 67 BPM | SYSTOLIC BLOOD PRESSURE: 108 MMHG | TEMPERATURE: 98.6 F

## 2022-11-06 DIAGNOSIS — K04.7 TOOTH ABSCESS: Primary | ICD-10-CM

## 2022-11-06 PROCEDURE — 99214 OFFICE O/P EST MOD 30 MIN: CPT | Performed by: PHYSICIAN ASSISTANT

## 2022-11-06 ASSESSMENT — PAIN SCALES - GENERAL: PAINLEVEL: EXTREME PAIN (9)

## 2022-11-07 NOTE — PATIENT INSTRUCTIONS
November 6, 2022 [facility] Plan:       1. Please start the antibiotic I prescribed for you today.      2. As we discussed, if your have an abscess in your tooth, it is not enough to just take the antibiotic. You will need to be seen and re-evaluated by your dentist in the next 1-2 days.      3. If your your tooth pain worsens, or if you develop any of the below if your dentist cannot see you), you will need to be seen at an emergency room.      Unusual drowsiness  Headache or stiff neck  Weakness or fainting  Difficulty swallowing, breathing, or opening your mouth  Swollen eyelids  Your face becomes more swollen or red  Pain gets worse or spreads to your neck  Fever of 100.4  F (38.0  C) or higher, or as directed by your healthcare provider              Pus drains from the tooth

## 2022-11-07 NOTE — PROGRESS NOTES
ASSESSMENT/PLAN:     (K04.7) Tooth abscess  (primary encounter diagnosis)    MDM: Tooth abscess. Started on Augmentin today. Patient educated this will not cure dental abscess ant educated short interval appointment with dentist is needed.  Patient has been educated about the potential for dental abscesses to develop into very severe, even life-threatening, infections if they spread to sinuses, face and neck.   Criteria for urgent and emergent follow-up is reviewed verbally and provided in printed form today. Please see below discharge summary .    Plan: amoxicillin-clavulanate (AUGMENTIN) 875-125 MG         tablet, DISCONTINUED: amoxicillin-clavulanate         (AUGMENTIN) 875-125 MG tablet         November 6, 2022 [facility] Plan:       1. Please start the antibiotic I prescribed for you today.      2. As we discussed, if your have an abscess in your tooth, it is not enough to just take the antibiotic. You will need to be seen and re-evaluated by your dentist in the next 1-2 days.      3. If your your tooth pain worsens, or if you develop any of the below if your dentist cannot see you), you will need to be seen at an emergency room.        Unusual drowsiness    Headache or stiff neck    Weakness or fainting    Difficulty swallowing, breathing, or opening your mouth    Swollen eyelids    Your face becomes more swollen or red    Pain gets worse or spreads to your neck    Fever of 100.4  F (38.0  C) or higher, or as directed by your healthcare provider              Pus drains from the tooth  --------------------------      SUBJECTIVE:     Anjelica Ocampo presents to  today with concerns about a possible left sided tooth infection. Patient state she knows she has a crack in one of her left upper back teeth. She developed acute left upper back tooth pain, and later left lower tooth pain yesterday.        HPI:       ROS: No associated fever or chills. No associated ENT symptoms. No associated eye  swelling, facial swelling or lateral neck swelling. No associated headache or stiff neck. No associated chest pain or shortness of breath.      Past Medical History:   Diagnosis Date     Chronic gastritis        Patient Active Problem List   Diagnosis     Chronic gastritis     Acne     Encounter for triage in pregnant patient     Short interval between pregnancies affecting pregnancy in third trimester, antepartum     Vaginal delivery       No current outpatient medications on file.     No current facility-administered medications for this visit.       No Known Allergies        OBJECTIVE:  /68   Pulse 67   Temp 98.6  F (37  C) (Tympanic)   SpO2 99%   Breastfeeding No          General appearance: alert and no apparent distress  Skin color is uniform and without rash, hives or vesicular eruption. No evidence of facial cellulitis.   HEENT:   EYES: Conjunctiva not injected.  Sclera clear. No pre-septal or kindra-orbital cellulitis.   Left TM is normal: no effusions, no erythema, and normal landmarks.  Right TM is normal: no effusions, no erythema, and normal landmarks.  Oropharyngeal exam is positive for left upper molar tenderness on percussion of affected tooth with tongue depressor. No gum swelling or erythema. No tooth drainage. Remainder of oropharyngeal normal: no lesions, erythema, adenopathy or exudate. No facial swelling or lateral neck swelling.   NECK: Trachea is midline. Neck is supple, FROM with no adenopathy  CARDIAC:NORMAL - regular rate and rhythm without murmur.  RESP: Normal - CTA without rales, rhonchi, or wheezing.  NEURO: Alert and oriented.  Normal speech and mentation.  CN II/XII grossly intact.  Gait within normal limits.

## 2023-10-24 ENCOUNTER — HOSPITAL ENCOUNTER (OUTPATIENT)
Dept: ULTRASOUND IMAGING | Facility: CLINIC | Age: 28
Discharge: HOME | End: 2023-10-24
Attending: FAMILY MEDICINE | Admitting: FAMILY MEDICINE

## 2023-10-24 DIAGNOSIS — Z34.82 ENCOUNTER FOR SUPERVISION OF NORMAL PREGNANCY IN MULTIGRAVIDA IN SECOND TRIMESTER: ICD-10-CM

## 2023-10-24 PROCEDURE — 76805 OB US >/= 14 WKS SNGL FETUS: CPT

## 2023-10-24 PROCEDURE — 76805 OB US >/= 14 WKS SNGL FETUS: CPT | Mod: 26 | Performed by: RADIOLOGY

## 2023-12-19 ENCOUNTER — HOSPITAL ENCOUNTER (OUTPATIENT)
Facility: CLINIC | Age: 28
Discharge: HOME | End: 2023-12-19
Attending: MIDWIFE | Admitting: OBSTETRICS & GYNECOLOGY
Payer: MEDICAID

## 2023-12-19 ENCOUNTER — HOSPITAL ENCOUNTER (OUTPATIENT)
Facility: CLINIC | Age: 28
End: 2023-12-19
Admitting: OBSTETRICS & GYNECOLOGY
Payer: MEDICAID

## 2023-12-19 VITALS
HEIGHT: 63 IN | TEMPERATURE: 98 F | WEIGHT: 155 LBS | SYSTOLIC BLOOD PRESSURE: 128 MMHG | DIASTOLIC BLOOD PRESSURE: 70 MMHG | RESPIRATION RATE: 16 BRPM | BODY MASS INDEX: 27.46 KG/M2

## 2023-12-19 DIAGNOSIS — R51.9 INTRACTABLE HEADACHE, UNSPECIFIED CHRONICITY PATTERN, UNSPECIFIED HEADACHE TYPE: Primary | ICD-10-CM

## 2023-12-19 LAB
ALBUMIN MFR UR ELPH: 11.5 MG/DL
ALBUMIN SERPL BCG-MCNC: 3.5 G/DL (ref 3.5–5.2)
ALBUMIN UR-MCNC: NEGATIVE MG/DL
ALP SERPL-CCNC: 80 U/L (ref 40–150)
ALT SERPL W P-5'-P-CCNC: 7 U/L (ref 0–50)
ANION GAP SERPL CALCULATED.3IONS-SCNC: 11 MMOL/L (ref 7–15)
APPEARANCE UR: CLEAR
AST SERPL W P-5'-P-CCNC: 12 U/L (ref 0–45)
BILIRUB SERPL-MCNC: 0.3 MG/DL
BILIRUB UR QL STRIP: NEGATIVE
BUN SERPL-MCNC: 6.9 MG/DL (ref 6–20)
CALCIUM SERPL-MCNC: 9.2 MG/DL (ref 8.6–10)
CHLORIDE SERPL-SCNC: 100 MMOL/L (ref 98–107)
COLOR UR AUTO: ABNORMAL
CREAT SERPL-MCNC: 0.58 MG/DL (ref 0.51–0.95)
CREAT UR-MCNC: 144.8 MG/DL
DEPRECATED HCO3 PLAS-SCNC: 22 MMOL/L (ref 22–29)
EGFRCR SERPLBLD CKD-EPI 2021: >90 ML/MIN/1.73M2
ERYTHROCYTE [DISTWIDTH] IN BLOOD BY AUTOMATED COUNT: 13.1 % (ref 10–15)
GLUCOSE SERPL-MCNC: 80 MG/DL (ref 70–99)
GLUCOSE UR STRIP-MCNC: NEGATIVE MG/DL
HCT VFR BLD AUTO: 35.7 % (ref 35–47)
HGB BLD-MCNC: 12.1 G/DL (ref 11.7–15.7)
HGB UR QL STRIP: NEGATIVE
KETONES UR STRIP-MCNC: 20 MG/DL
LEUKOCYTE ESTERASE UR QL STRIP: NEGATIVE
MCH RBC QN AUTO: 31.3 PG (ref 26.5–33)
MCHC RBC AUTO-ENTMCNC: 33.9 G/DL (ref 31.5–36.5)
MCV RBC AUTO: 92 FL (ref 78–100)
MUCOUS THREADS #/AREA URNS LPF: PRESENT /LPF
NITRATE UR QL: NEGATIVE
PH UR STRIP: 6 [PH] (ref 5–7)
PLATELET # BLD AUTO: 323 10E3/UL (ref 150–450)
POTASSIUM SERPL-SCNC: 3.8 MMOL/L (ref 3.4–5.3)
PROT SERPL-MCNC: 7.2 G/DL (ref 6.4–8.3)
PROT/CREAT 24H UR: 0.08 MG/MG CR (ref 0–0.2)
RBC # BLD AUTO: 3.87 10E6/UL (ref 3.8–5.2)
RBC URINE: <1 /HPF
SODIUM SERPL-SCNC: 133 MMOL/L (ref 135–145)
SP GR UR STRIP: 1.02 (ref 1–1.03)
SQUAMOUS EPITHELIAL: 4 /HPF
TRANSITIONAL EPI: <1 /HPF
UROBILINOGEN UR STRIP-MCNC: NORMAL MG/DL
WBC # BLD AUTO: 14 10E3/UL (ref 4–11)
WBC URINE: 4 /HPF

## 2023-12-19 PROCEDURE — 250N000013 HC RX MED GY IP 250 OP 250 PS 637

## 2023-12-19 PROCEDURE — 84156 ASSAY OF PROTEIN URINE: CPT

## 2023-12-19 PROCEDURE — 36415 COLL VENOUS BLD VENIPUNCTURE: CPT

## 2023-12-19 PROCEDURE — 80053 COMPREHEN METABOLIC PANEL: CPT

## 2023-12-19 PROCEDURE — G0463 HOSPITAL OUTPT CLINIC VISIT: HCPCS

## 2023-12-19 PROCEDURE — 85027 COMPLETE CBC AUTOMATED: CPT

## 2023-12-19 PROCEDURE — 81001 URINALYSIS AUTO W/SCOPE: CPT

## 2023-12-19 RX ORDER — ACETAMINOPHEN 500 MG
500-1000 TABLET ORAL EVERY 6 HOURS PRN
Qty: 30 TABLET | Refills: 0 | Status: SHIPPED | OUTPATIENT
Start: 2023-12-19

## 2023-12-19 RX ORDER — METOCLOPRAMIDE 10 MG/1
10 TABLET ORAL
Status: DISCONTINUED | OUTPATIENT
Start: 2023-12-19 | End: 2023-12-19 | Stop reason: HOSPADM

## 2023-12-19 RX ORDER — LIDOCAINE 40 MG/G
CREAM TOPICAL
Status: DISCONTINUED | OUTPATIENT
Start: 2023-12-19 | End: 2023-12-19 | Stop reason: HOSPADM

## 2023-12-19 RX ORDER — DIPHENHYDRAMINE HYDROCHLORIDE 50 MG/ML
25 INJECTION INTRAMUSCULAR; INTRAVENOUS EVERY 6 HOURS PRN
Status: DISCONTINUED | OUTPATIENT
Start: 2023-12-19 | End: 2023-12-19 | Stop reason: HOSPADM

## 2023-12-19 RX ORDER — ACETAMINOPHEN 325 MG/1
650 TABLET ORAL EVERY 4 HOURS PRN
Status: DISCONTINUED | OUTPATIENT
Start: 2023-12-19 | End: 2023-12-19 | Stop reason: HOSPADM

## 2023-12-19 RX ORDER — METOCLOPRAMIDE 5 MG/1
5 TABLET ORAL 3 TIMES DAILY PRN
Qty: 15 TABLET | Refills: 0 | Status: ON HOLD | OUTPATIENT
Start: 2023-12-19 | End: 2024-03-14

## 2023-12-19 RX ORDER — DIPHENHYDRAMINE HCL 25 MG
25 CAPSULE ORAL EVERY 6 HOURS PRN
Qty: 30 CAPSULE | Refills: 0 | Status: ON HOLD | OUTPATIENT
Start: 2023-12-19 | End: 2024-03-14

## 2023-12-19 RX ORDER — DIPHENHYDRAMINE HCL 25 MG
25 CAPSULE ORAL EVERY 6 HOURS PRN
Status: DISCONTINUED | OUTPATIENT
Start: 2023-12-19 | End: 2023-12-19 | Stop reason: HOSPADM

## 2023-12-19 RX ORDER — ACETAMINOPHEN 650 MG/1
650 SUPPOSITORY RECTAL EVERY 4 HOURS PRN
Status: DISCONTINUED | OUTPATIENT
Start: 2023-12-19 | End: 2023-12-19 | Stop reason: HOSPADM

## 2023-12-19 RX ORDER — ONDANSETRON 4 MG/1
4 TABLET, FILM COATED ORAL EVERY 8 HOURS PRN
Status: ON HOLD | COMMUNITY
End: 2024-03-14

## 2023-12-19 RX ADMIN — ACETAMINOPHEN 650 MG: 325 TABLET, FILM COATED ORAL at 19:33

## 2023-12-19 RX ADMIN — METOCLOPRAMIDE 10 MG: 10 TABLET ORAL at 19:33

## 2023-12-19 RX ADMIN — DIPHENHYDRAMINE HYDROCHLORIDE 25 MG: 25 CAPSULE ORAL at 19:33

## 2023-12-19 ASSESSMENT — ACTIVITIES OF DAILY LIVING (ADL)
ADLS_ACUITY_SCORE: 35
ADLS_ACUITY_SCORE: 20

## 2023-12-20 NOTE — PROGRESS NOTES
Data: [facility] to [facility] at 1845. Assumed care at 1930.  Reason for maternal/fetal assessment per patient is Rule Out Pre-eclampsia  .  Patient is a . Prenatal record reviewed.      OB History    Para Term  AB Living   3 1 1 0 1 1   SAB IAB Ectopic Multiple Live Births   1 0 0 0 1      # Outcome Date GA Lbr Vamsi/2nd Weight Sex Delivery Anes PTL Lv   3 Current            2 Term 02/10/17   3.402 kg (7 lb 8 oz) M Vag-Spont None  DEVENDRA   1 2012           . Medical history:   Past Medical History:   Diagnosis Date    Chronic gastritis    . Gestational Age Unknown. VSS. Fetal movement present. Patient denies cramping, backache, pelvic pressure, UTI symptoms, GI problems, bloody show, vaginal bleeding, edema, epigastric or URQ pain, abdominal pain, rupture of membranes. Support person present.    Action: Triage assessment completed. EFM applied for 30 minutes, then discontinued. Uterine assessment shows no contractions. Fetal assessment: Presumed adequate fetal oxygenation documented (see flow record).     Response: Dr. Gamez already saw the patient. Plan per provider is labs to rule out pre-eclampsia. Patient verbalized agreement with plan.     Labs within normal limits. Headache improved with medications - tylenol, benadryl and Reglan. Prescriptions for medication sent to pharmacy of choice. Patient received verbal discharge instructions, but left before receiving the hard copy of discharge papers. Plan is to follow up as scheduled in clinic.

## 2023-12-20 NOTE — DISCHARGE INSTRUCTIONS
Discharge Instruction for Undelivered Patients      You were seen for:  pre-eclampsia evaluation  We Consulted: Dr. Franco Ponce  You had (Test or Medicine): Tylenol, Benadryl and Reglan.  You had blood works and urinalysis done.    Diet:   Drink 8 to 12 glasses of liquids (milk, juice, water) every day.  You may eat meals and snacks.     Activity:  Count fetal kicks everyday (see handout)  Call your doctor or nurse midwife if your baby is moving less than usual.     Call your provider if you notice:  Swelling in your face or increased swelling in your hands or legs.  Headaches that are not relieved by Tylenol (acetaminophen).  Changes in your vision (blurring: seeing spots or stars.)  Nausea (sick to your stomach) and vomiting (throwing up).   Weight gain of 5 pounds or more per week.  Heartburn that doesn't go away.  Signs of bladder infection: pain when you urinate (use the toilet), need to go more often and more urgently.  The bag of lozano (rupture of membranes) breaks, or you notice leaking in your underwear.  Bright red blood in your underwear.  Abdominal (lower belly) or stomach pain.  Second (plus) baby: Contractions (tightening) less than 10 minutes apart and getting stronger.  *If less than 34 weeks: Contractions (tightening) more than 6 times in one hour.  Increase or change in vaginal discharge (note the color and amount)      Follow-up:  As scheduled in the clinic

## 2023-12-20 NOTE — PROGRESS NOTES
"Obstetrics Triage Note    HPI:  Anjelica Ocampo is a 28 year old  at 28w2d by LMP, pregnancy complicated by chronic nausea, here with complaints of HA.      Patient states that she has had a \"migraine\" for the past three days. She notes the headache as 8/10 and unrelenting. She has not tried anything to make it better. She reports mild blurry vision but that has resolved. She reports baseline nausea that is treated with zofran PTA.    She reports  + FM.  She states that she has otherwise been feeling well. She denies fever, N/V, chest pain, SOB, abdominal pain, vaginal bleeding, vaginal discharge, dysuria, constipation.    Pregnancy is complicated by:  - chronic nausea  - Marijuana use in pregnancy     ROS:  Negative except as mentioned in HPI.    PMH:  Past Medical History:   Diagnosis Date    Chronic gastritis        PSHx:  Past Surgical History:   Procedure Laterality Date    NO HISTORY OF SURGERY         Medications:  Current Facility-Administered Medications   Medication    acetaminophen (TYLENOL) tablet 650 mg    Or    acetaminophen (TYLENOL) Suppository 650 mg    diphenhydrAMINE (BENADRYL) capsule 25 mg    Or    diphenhydrAMINE (BENADRYL) injection 25 mg    metoclopramide (REGLAN) tablet 10 mg       Allergies:   No Known Allergies    Physical Exam:   Vitals:    23 1853 23 1857 23 1908   BP: 120/69  119/68   BP Location:   Left arm   Patient Position:   Semi-Barone's   Cuff Size:   Adult Regular   Resp:  16    Temp:  97.9  F (36.6  C)    TempSrc:  Oral    Weight:  70.3 kg (155 lb)    Height:  1.6 m (5' 3\")       Gen: resting comfortably, in NAD  CV: Regular rate and rhythm,   Pulm: CTAB, no increased work of breathing  Abd: soft, gravid, non-tender, non-distended    NST:  FHT: , mod variability,  accelerations, no decelerations  Three Way: none    Labs:    Assessment/Plan: Anjelica Ocampo is a 28 year old  at 28w2d by LMP, here for rule out preE with 3 day " HA.    #r/o PreE  #headache  Patients HA improved after tylenol, benadryl and reglan. Patient no reporting 3-4/10 and manageable. Patient denies other si/sx of preE. No hx of elevated blood pressure in the pregnancy. Blood pressures normotensive in clinic. Low suspicion for  pre-eclampsia at this time. More likely tension headache given bilateral and around eyes. Recommend medication management and hydration at home. Follow up Friday at [facility].   -HELLP labs wnl  - blood pressure normotensive    FHT reactive and appropriate for gestational age    - Dispo: to home with follow up Friday at [facility] Discussed tylenol, reglan and benadryl for HA as needed. Discussed labor warning signs and indication to return to care.    Franco Ponce DO MA  [facility]- PGY2  11:33 PM December 19, 2023     The patient was reviewed with Dr. Ponce.  I agree with the above assessment and plan of care.     Shirley Johnson MD, FACOG

## 2024-01-15 ENCOUNTER — TRANSFERRED RECORDS (OUTPATIENT)
Dept: HEALTH INFORMATION MANAGEMENT | Facility: CLINIC | Age: 29
End: 2024-01-15

## 2024-01-16 ENCOUNTER — TRANSFERRED RECORDS (OUTPATIENT)
Dept: HEALTH INFORMATION MANAGEMENT | Facility: CLINIC | Age: 29
End: 2024-01-16
Payer: COMMERCIAL

## 2024-01-24 ENCOUNTER — HOSPITAL ENCOUNTER (OUTPATIENT)
Dept: ULTRASOUND IMAGING | Facility: CLINIC | Age: 29
Discharge: HOME | End: 2024-01-24
Attending: FAMILY MEDICINE | Admitting: FAMILY MEDICINE
Payer: COMMERCIAL

## 2024-01-24 DIAGNOSIS — Z34.83 MULTIGRAVIDA IN THIRD TRIMESTER: ICD-10-CM

## 2024-01-24 DIAGNOSIS — O26.843 UTERINE SIZE-DATE DISCREPANCY IN THIRD TRIMESTER: ICD-10-CM

## 2024-01-24 PROCEDURE — 76805 OB US >/= 14 WKS SNGL FETUS: CPT | Mod: 26 | Performed by: RADIOLOGY

## 2024-01-24 PROCEDURE — 76805 OB US >/= 14 WKS SNGL FETUS: CPT

## 2024-01-25 ENCOUNTER — MEDICAL CORRESPONDENCE (OUTPATIENT)
Dept: HEALTH INFORMATION MANAGEMENT | Facility: CLINIC | Age: 29
End: 2024-01-25
Payer: COMMERCIAL

## 2024-01-29 ENCOUNTER — TELEPHONE (OUTPATIENT)
Dept: CARDIOLOGY | Facility: CLINIC | Age: 29
End: 2024-01-29
Payer: COMMERCIAL

## 2024-02-01 ENCOUNTER — HOSPITAL ENCOUNTER (OUTPATIENT)
Dept: CARDIOLOGY | Facility: CLINIC | Age: 29
Discharge: HOME | End: 2024-02-01
Admitting: PEDIATRICS
Payer: COMMERCIAL

## 2024-02-01 DIAGNOSIS — O26.90 PREGNANCY RELATED CONDITION, ANTEPARTUM: ICD-10-CM

## 2024-02-01 PROCEDURE — 76827 ECHO EXAM OF FETAL HEART: CPT | Mod: 26 | Performed by: PEDIATRICS

## 2024-02-01 PROCEDURE — 76825 ECHO EXAM OF FETAL HEART: CPT | Mod: 26 | Performed by: PEDIATRICS

## 2024-02-01 PROCEDURE — 99202 OFFICE O/P NEW SF 15 MIN: CPT | Mod: 25 | Performed by: PEDIATRICS

## 2024-02-01 PROCEDURE — 76827 ECHO EXAM OF FETAL HEART: CPT

## 2024-02-01 PROCEDURE — 93325 DOPPLER ECHO COLOR FLOW MAPG: CPT | Mod: 26 | Performed by: PEDIATRICS

## 2024-02-01 PROCEDURE — 93325 DOPPLER ECHO COLOR FLOW MAPG: CPT

## 2024-02-01 NOTE — CONSULTS
[facility]   [facility] Fetal Cardiology Consult    Patient:  Anjelica Ocampo MRN:  [id number]   YOB: 1995 Age:  28 year old   Date of Visit:  2/1/2024 PCP:  No Ref-Primary, Physician   Due Date: Data Unavailable Delivery:        Dear Dr. Johnson    I had the opportunity to meet with Anjelica and her partner today for a Fetal Cardiology Consult and Fetal Echocardiography at the [facility] on 2/1/2024    Fetal Echo demonstrated Likely normal fetal echocardiogram. Fetal heart rate is regular at 140 bpm. The ductus arteriosus appears tortuous with mildly increased flow.   Difficult study due to fetal position and poor imaging windows. No hydrops.    I have reviewed the Fetal Echo findings.      The parents had appropriate questions. I did my best to answer their questions.    Plan:  No additional fetal echo is needed    Thank you for allowing me to participate in Anjelica's care.  Feel free to contact me with questions.    I spent 10 minutes counseling the patient about her fetal echocardiogram findings. All of this time was face to face.    Dr Velia Aldana  Pediatric Cardiologist  [facility]  Phone [phone]

## 2024-03-06 ENCOUNTER — HOSPITAL ENCOUNTER (OUTPATIENT)
Facility: CLINIC | Age: 29
Discharge: HOME | End: 2024-03-07
Attending: ADVANCED PRACTICE MIDWIFE | Admitting: ADVANCED PRACTICE MIDWIFE
Payer: COMMERCIAL

## 2024-03-06 VITALS — SYSTOLIC BLOOD PRESSURE: 123 MMHG | TEMPERATURE: 98 F | RESPIRATION RATE: 16 BRPM | DIASTOLIC BLOOD PRESSURE: 71 MMHG

## 2024-03-06 LAB
CLUE CELLS: ABNORMAL
CRYSTALS AMN MICRO: NORMAL
RUPTURE OF FETAL MEMBRANES BY ROM PLUS: NEGATIVE
TRICHOMONAS, WET PREP: ABNORMAL
WBC'S/HIGH POWER FIELD, WET PREP: ABNORMAL
YEAST, WET PREP: ABNORMAL

## 2024-03-06 PROCEDURE — 87491 CHLMYD TRACH DNA AMP PROBE: CPT | Performed by: ADVANCED PRACTICE MIDWIFE

## 2024-03-06 PROCEDURE — G0463 HOSPITAL OUTPT CLINIC VISIT: HCPCS | Mod: 25

## 2024-03-06 PROCEDURE — 59025 FETAL NON-STRESS TEST: CPT | Mod: 26 | Performed by: ADVANCED PRACTICE MIDWIFE

## 2024-03-06 PROCEDURE — 87210 SMEAR WET MOUNT SALINE/INK: CPT | Performed by: ADVANCED PRACTICE MIDWIFE

## 2024-03-06 PROCEDURE — 99214 OFFICE O/P EST MOD 30 MIN: CPT | Mod: 25 | Performed by: ADVANCED PRACTICE MIDWIFE

## 2024-03-06 PROCEDURE — 87591 N.GONORRHOEAE DNA AMP PROB: CPT | Performed by: ADVANCED PRACTICE MIDWIFE

## 2024-03-06 PROCEDURE — 84112 EVAL AMNIOTIC FLUID PROTEIN: CPT | Performed by: ADVANCED PRACTICE MIDWIFE

## 2024-03-06 RX ORDER — LIDOCAINE 40 MG/G
CREAM TOPICAL
Status: DISCONTINUED | OUTPATIENT
Start: 2024-03-06 | End: 2024-03-07 | Stop reason: HOSPADM

## 2024-03-06 ASSESSMENT — ACTIVITIES OF DAILY LIVING (ADL)
ADLS_ACUITY_SCORE: 18
ADLS_ACUITY_SCORE: 18

## 2024-03-07 ENCOUNTER — HOSPITAL ENCOUNTER (OUTPATIENT)
Facility: CLINIC | Age: 29
End: 2024-03-07
Admitting: ADVANCED PRACTICE MIDWIFE
Payer: COMMERCIAL

## 2024-03-07 LAB
C TRACH DNA SPEC QL NAA+PROBE: NEGATIVE
N GONORRHOEA DNA SPEC QL NAA+PROBE: NEGATIVE

## 2024-03-07 PROCEDURE — G0463 HOSPITAL OUTPT CLINIC VISIT: HCPCS

## 2024-03-07 PROCEDURE — 59025 FETAL NON-STRESS TEST: CPT

## 2024-03-07 NOTE — DISCHARGE INSTRUCTIONS
Seen in triage on 3/6/24.    Bedside ultrasound confirmed cephalic presentation.  Sterile speculum exam performed- negative pooling. Negative ROM plus, negative ferning, negative wet prep.  Cervical exam 2/50/-3, post/med.    Discussed admission for IOL or augmentation vs. Discharge to home.  Pt desires discharge. Follow up at [facility] appointment tomorrow.

## 2024-03-07 NOTE — PLAN OF CARE
Data: Patient assessed in the [facility] for uterine contractions, decreased fetal movement, and leaking vaginal fluid. Cervix 2 cm dilated and 50% effaced. Fetal station -3. Membranes intact. Contractions are present. Uterine assessment is mild by palpation during contractions and soft by palpation at rest. See flowsheets for fetal assessment documentation.     Action: Presumed adequate fetal oxygenation documented. Discharge instructions reviewed. Patient instructed to report change in fetal movement, vaginal leaking of fluid or bleeding, abdominal pain, or any concerns related to the pregnancy to provider/clinic.      Response: Orders to discharge home per Dewey Lambert CNM. Patient verbalized understanding of education and agreement with plan. Discharged to home at 0010.

## 2024-03-07 NOTE — PLAN OF CARE
Data: Patient presented to [facility]: 3/6/2024  9:07 PM.  Reason for maternal/fetal assessment is uterine contractions, decreased fetal movement, and leaking vaginal fluid. . Patient denies vaginal bleeding, pelvic pressure, nausea, vomiting, headache, visual disturbances, epigastric or RUQ pain. Patient reports fetal movement is decreased. Patient is a Unknown .  Prenatal record reviewed. Pregnancy has been uncomplicated.    Vital signs wnl. Support person is present.     Action: Verbal consent for EFM. Triage assessment completed.     Response: Patient verbalized agreement with plan. Will contact Dewey Lambert with update and further orders.  Plan per Dewey, collect Labs.

## 2024-03-07 NOTE — PROGRESS NOTES
HOSPITAL TRIAGE NOTE  ===================    CHIEF COMPLAINT  ========================  Anjelica Ocampo is a 28 year old patient presenting today at 39w3d for evaluation of uterine contractions, R/O ROM.    Patient's last menstrual period was 2023.  Estimated Date of Delivery: Mar 10, 2024     HPI  ==================   Anjelica Ocampo presented to the birthplace with complaints if abdominal cramping radiating to the back since 3/3/2024. She described the contractions as severe , every 7-9 minutes apart. They later stopped and has been having back pain which is usually a sign to her that labor has began so she decided to present for evaluation. She also reports leakage of scant fluid since 3/2 or 3/3, unsure of the date. She denies a gush of fluid but has been changing her underwear at least 3 times daily. She reports no changes in fetal movement.   Denies fever, cough, SOB or chest pain. Denies having contact with anyone who is Covid-19 positive. Pt has not had Covid-19 Vaccinations. Prenatal record and labs reviewed from [facility], through faxed records.    CONTRACTIONS: irreg  ABDOMINAL PAIN: cramping  FETAL MOVEMENT: active    VAGINAL BLEEDING: none  RUPTURE OF MEMBRANES: no  PELVIC PAIN: none    PREGNANCY COMPLICATIONS: none    # Pain Assessment:      3/6/2024     9:21 PM   Current Pain Score   Patient currently in pain? yes   - Anjelica is experiencing pain due to cramping. Pain management was discussed with Anjelica and her family and the plan was created in a collaborative fashion.  Anjelica's response to the current recommendations: engaged  - Non-pharmacologic adjuvants: Heat      REVIEW OF SYSTEMS  =====================  C: NEGATIVE for fever, chills  I: NEGATIVE for worrisome rashes, moles or lesions  E: NEGATIVE for vision changes or irritation  RESP:unlabored breathing  GI: NEGATIVE for nausea, abdominal pain, heartburn, or change in bowel habits  :  NEGATIVE for frequency, dysuria, or hematuria  M: NEGATIVE for significant arthralgias or myalgia  N: NEGATIVE for headache, weakness, dizziness or paresthesias  P: NEGATIVE for changes in mood or affect    PROBLEM LIST  ===============  Patient Active Problem List    Diagnosis Date Noted    Labor and delivery indication for care or intervention 2024     Priority: Medium    Short interval between pregnancies affecting pregnancy in third trimester, antepartum 2018     Priority: Medium     Formatting of this note might be different from the original.  21 y.o.   Medical concerns - none  Genetic screening: none   BMI:# 26.09  Recommended wt gain - 25-35 pounds  Ultrasound findings:   Flu vaccine: 2016  Pertussis Vaccine: 12/15/16  Peds:    Single keeping  FOB: involved, Levi      Encounter for triage in pregnant patient 2018     Priority: Medium    Vaginal delivery 02/10/2017     Priority: Medium    Acne 2010     Priority: Medium    Chronic gastritis      Priority: Medium       HISTORIES  ==============  ALLERGIES:    No Known Allergies  PAST MEDICAL HISTORY  Past Medical History:   Diagnosis Date    Chronic gastritis      SOCIAL HISTORY  Social History     Socioeconomic History    Marital status: Single     Spouse name: Not on file    Number of children: Not on file    Years of education: Not on file    Highest education level: Not on file   Occupational History    Occupation: 6th Grade     Employer: [employer]     Comment: [employer]   Tobacco Use    Smoking status: Never     Passive exposure: Yes    Smokeless tobacco: Never    Tobacco comments:     mom smokes    Substance and Sexual Activity    Alcohol use: Not Currently     Alcohol/week: 0.8 standard drinks of alcohol     Types: 1 Shots of liquor per week    Drug use: No     Types: Marijuana    Sexual activity: Yes     Partners: Male     Birth control/protection: None   Other Topics Concern    Not on file   Social History  Narrative    Not on file     Social Determinants of Health     Financial Resource Strain: Not on file   Food Insecurity: Not on file   Transportation Needs: Not on file   Physical Activity: Not on file   Stress: Not on file   Social Connections: Not on file   Interpersonal Safety: Not on file   Housing Stability: Not on file     PARTNER: Dave  FAMILY HISTORY  Family History   Problem Relation Age of Onset    Thyroid Disease Mother     Diabetes Maternal Grandmother     Breast Cancer Maternal Grandmother     Cancer Maternal Grandmother     Cerebrovascular Disease Maternal Grandmother     Hypertension Other      OB HISTORY  OB History    Para Term  AB Living   4 2 2 0 1 2   SAB IAB Ectopic Multiple Live Births   1 0 0 0 2      # Outcome Date GA Lbr Vamsi/2nd Weight Sex Delivery Anes PTL Lv   4 Current            3 Term 18 40w0d  3.175 kg (7 lb) F Vag-Spont None  DEVENDRA   2 Term 02/10/17   3.402 kg (7 lb 8 oz) M Vag-Spont None  DEVENDRA   1 2012             Prenatal Labs:   Lab Results   Component Value Date    ABO O 2012    RH  Pos 2012    AS Neg 2012    HEPBANG Nonreactive 10/30/2017    HGB 12.1 2023    HIAGAB Nonreactive 10/30/2017    GLU1 138 2017     Rubella- immune    EXAM  ============  /71 (BP Location: Left arm, Patient Position: Semi-Barone's, Cuff Size: Adult Regular)   Temp 98  F (36.7  C) (Oral)   Resp 16   LMP 2023   GENERAL APPEARANCE: healthy, alert and no distress  RESP: unlabored breathing  CV: regular rates and rhythm, normal S1 S2, no S3 or S4 and no murmur,and no varicosities  ABDOMEN:  soft, nontender, no epigastric pain  SKIN: no suspicious lesions or rashes  NEURO: Denies headache, blurred vision, other vision changes  PSYCH: mentation appears normal. and affect normal/bright  MS/ LEGS: No edema    CONTRACTIONS: irreg   FETAL HEART TONES: continuous EFM- baseline 125 with moderate variability and positive accelerations. No  decelerations.  NST: REACTIVE  EFW:7.25 lbs    PELVIC EXAM: 2/ 50%/ Posterior/ average/ -3   WOOD SCORE: 3  PRESENTATION: VERTEX  BLOOD: no  DISCHARGE: white    ROM: no  ROMPLUS: negative    LABS: Wet prep and GC/ Chlamydia  Lab results reviewed- wet prep, ROM plus and ferning  Results for orders placed or performed during the hospital encounter of 03/06/24   Fern Test for Rupture of Membranes     Status: Normal   Result Value Ref Range    Fern Crystallization No ferning present No ferning present   Rupture of Fetal Membranes by ROM Plus     Status: Normal   Result Value Ref Range    Rupture of Fetal Membranes by ROM Plus Negative Negative, Invalid, Suggest Repeat    Narrative    It is recommended that the tests to detect rupture of the amniotic membranes should not be used without other clinical assessments to make clinical patient management decision.   Neisseria gonorrhoea PCR     Status: Normal    Specimen: Vagina; Swab   Result Value Ref Range    Neisseria gonorrhoeae Negative Negative   Chlamydia trachomatis PCR     Status: Normal    Specimen: Vagina; Swab   Result Value Ref Range    Chlamydia trachomatis Negative Negative   Wet prep     Status: Abnormal    Specimen: Vagina; Swab   Result Value Ref Range    Trichomonas Absent Absent    Yeast Absent Absent    Clue Cells Absent Absent    WBCs/high power field 1+ (A) None       DIAGNOSIS  ============  39w3d seen on the Birthplace Triage for labor evaluation and ROM  NST: REACTIVE  Fetal Heart Tones:category one  Patient Active Problem List   Diagnosis    Chronic gastritis    Acne    Encounter for triage in pregnant patient    Short interval between pregnancies affecting pregnancy in third trimester, antepartum    Vaginal delivery    Labor and delivery indication for care or intervention       PLAN  ============  Offered Vistaril, patient declined  Discussed early labor and offered labor augmentation with history of fast labors , patient declined and hoping to  "await spontaneous labor  Discharge to home with labor instuctions per discharge instruction form  Call or return to the Birthplace with contractions, cramping, abdominal or pelvic pain, vaginal bleeding, leaking fluid or decreased fetal movement.  Follow up at your next clinic visit- 3/7/24 for a possible membrane sweep      I, Samara RENE, am serving as a scribe to document services personally performed by CNM based on the provider's statements to me.\" Samara RENE    The encounter was performed by me and scribed by the SNM. The scribed note accurately reflects my personal services and decisions made by me.     Dewey Lambert, IKE, MELIDAM       "

## 2024-03-11 ENCOUNTER — TRANSFERRED RECORDS (OUTPATIENT)
Dept: HEALTH INFORMATION MANAGEMENT | Facility: CLINIC | Age: 29
End: 2024-03-11
Payer: COMMERCIAL

## 2024-03-12 ENCOUNTER — ANESTHESIA EVENT (OUTPATIENT)
Dept: OBGYN | Facility: CLINIC | Age: 29
End: 2024-03-12
Payer: COMMERCIAL

## 2024-03-12 ENCOUNTER — HOSPITAL ENCOUNTER (INPATIENT)
Facility: CLINIC | Age: 29
LOS: 2 days | Discharge: HOME HEALTH | End: 2024-03-14
Attending: ADVANCED PRACTICE MIDWIFE | Admitting: ADVANCED PRACTICE MIDWIFE
Payer: COMMERCIAL

## 2024-03-12 ENCOUNTER — ANESTHESIA (OUTPATIENT)
Dept: OBGYN | Facility: CLINIC | Age: 29
End: 2024-03-12
Payer: COMMERCIAL

## 2024-03-12 PROBLEM — Z34.90 PREGNANCY: Status: ACTIVE | Noted: 2024-03-12

## 2024-03-12 PROBLEM — Z22.330 GBS CARRIER: Status: ACTIVE | Noted: 2023-08-28

## 2024-03-12 PROBLEM — Z64.1 MULTIGRAVIDA: Status: ACTIVE | Noted: 2018-01-11

## 2024-03-12 LAB
ABO/RH(D): NORMAL
AMPHETAMINES UR QL SCN: ABNORMAL
ANTIBODY SCREEN: NEGATIVE
BARBITURATES UR QL SCN: ABNORMAL
BENZODIAZ UR QL SCN: ABNORMAL
BZE UR QL SCN: ABNORMAL
CANNABINOIDS UR QL SCN: ABNORMAL
CREAT UR-MCNC: 344 MG/DL
ERYTHROCYTE [DISTWIDTH] IN BLOOD BY AUTOMATED COUNT: 13.3 % (ref 10–15)
FENTANYL UR QL: ABNORMAL
HCT VFR BLD AUTO: 34.9 % (ref 35–47)
HGB BLD-MCNC: 11.5 G/DL (ref 11.7–15.7)
MCH RBC QN AUTO: 29.2 PG (ref 26.5–33)
MCHC RBC AUTO-ENTMCNC: 33 G/DL (ref 31.5–36.5)
MCV RBC AUTO: 89 FL (ref 78–100)
OPIATES UR QL SCN: ABNORMAL
PCP QUAL URINE (ROCHE): ABNORMAL
PLATELET # BLD AUTO: 268 10E3/UL (ref 150–450)
RBC # BLD AUTO: 3.94 10E6/UL (ref 3.8–5.2)
SARS-COV-2 RNA RESP QL NAA+PROBE: NEGATIVE
SPECIMEN EXPIRATION DATE: NORMAL
T PALLIDUM AB SER QL: NONREACTIVE
WBC # BLD AUTO: 10.9 10E3/UL (ref 4–11)

## 2024-03-12 PROCEDURE — 258N000003 HC RX IP 258 OP 636: Performed by: ADVANCED PRACTICE MIDWIFE

## 2024-03-12 PROCEDURE — 86900 BLOOD TYPING SEROLOGIC ABO: CPT | Performed by: ADVANCED PRACTICE MIDWIFE

## 2024-03-12 PROCEDURE — 0UCC7ZZ EXTIRPATION OF MATTER FROM CERVIX, VIA NATURAL OR ARTIFICIAL OPENING: ICD-10-PCS | Performed by: ADVANCED PRACTICE MIDWIFE

## 2024-03-12 PROCEDURE — 250N000011 HC RX IP 250 OP 636: Performed by: STUDENT IN AN ORGANIZED HEALTH CARE EDUCATION/TRAINING PROGRAM

## 2024-03-12 PROCEDURE — 80307 DRUG TEST PRSMV CHEM ANLYZR: CPT | Performed by: ADVANCED PRACTICE MIDWIFE

## 2024-03-12 PROCEDURE — 85027 COMPLETE CBC AUTOMATED: CPT | Performed by: ADVANCED PRACTICE MIDWIFE

## 2024-03-12 PROCEDURE — 3E0R3BZ INTRODUCTION OF ANESTHETIC AGENT INTO SPINAL CANAL, PERCUTANEOUS APPROACH: ICD-10-PCS | Performed by: STUDENT IN AN ORGANIZED HEALTH CARE EDUCATION/TRAINING PROGRAM

## 2024-03-12 PROCEDURE — 250N000013 HC RX MED GY IP 250 OP 250 PS 637: Performed by: ADVANCED PRACTICE MIDWIFE

## 2024-03-12 PROCEDURE — 250N000011 HC RX IP 250 OP 636

## 2024-03-12 PROCEDURE — 10907ZC DRAINAGE OF AMNIOTIC FLUID, THERAPEUTIC FROM PRODUCTS OF CONCEPTION, VIA NATURAL OR ARTIFICIAL OPENING: ICD-10-PCS | Performed by: ADVANCED PRACTICE MIDWIFE

## 2024-03-12 PROCEDURE — 87635 SARS-COV-2 COVID-19 AMP PRB: CPT | Performed by: ADVANCED PRACTICE MIDWIFE

## 2024-03-12 PROCEDURE — 250N000011 HC RX IP 250 OP 636: Performed by: ADVANCED PRACTICE MIDWIFE

## 2024-03-12 PROCEDURE — 86780 TREPONEMA PALLIDUM: CPT | Performed by: ADVANCED PRACTICE MIDWIFE

## 2024-03-12 PROCEDURE — 258N000003 HC RX IP 258 OP 636: Performed by: STUDENT IN AN ORGANIZED HEALTH CARE EDUCATION/TRAINING PROGRAM

## 2024-03-12 PROCEDURE — 120N000002 HC R&B MED SURG/OB UMMC

## 2024-03-12 PROCEDURE — 80349 CANNABINOIDS NATURAL: CPT | Performed by: ADVANCED PRACTICE MIDWIFE

## 2024-03-12 PROCEDURE — 00HU33Z INSERTION OF INFUSION DEVICE INTO SPINAL CANAL, PERCUTANEOUS APPROACH: ICD-10-PCS | Performed by: STUDENT IN AN ORGANIZED HEALTH CARE EDUCATION/TRAINING PROGRAM

## 2024-03-12 PROCEDURE — 59409 OBSTETRICAL CARE: CPT | Performed by: ADVANCED PRACTICE MIDWIFE

## 2024-03-12 PROCEDURE — 370N000003 HC ANESTHESIA WARD SERVICE: Performed by: STUDENT IN AN ORGANIZED HEALTH CARE EDUCATION/TRAINING PROGRAM

## 2024-03-12 PROCEDURE — 59409 OBSTETRICAL CARE: CPT | Performed by: STUDENT IN AN ORGANIZED HEALTH CARE EDUCATION/TRAINING PROGRAM

## 2024-03-12 PROCEDURE — 3E033VJ INTRODUCTION OF OTHER HORMONE INTO PERIPHERAL VEIN, PERCUTANEOUS APPROACH: ICD-10-PCS | Performed by: ADVANCED PRACTICE MIDWIFE

## 2024-03-12 PROCEDURE — 250N000009 HC RX 250: Performed by: ADVANCED PRACTICE MIDWIFE

## 2024-03-12 PROCEDURE — 722N000001 HC LABOR CARE VAGINAL DELIVERY SINGLE

## 2024-03-12 RX ORDER — ONDANSETRON 2 MG/ML
4 INJECTION INTRAMUSCULAR; INTRAVENOUS EVERY 6 HOURS PRN
Status: DISCONTINUED | OUTPATIENT
Start: 2024-03-12 | End: 2024-03-12 | Stop reason: HOSPADM

## 2024-03-12 RX ORDER — IBUPROFEN 800 MG/1
800 TABLET, FILM COATED ORAL
Status: DISCONTINUED | OUTPATIENT
Start: 2024-03-12 | End: 2024-03-13

## 2024-03-12 RX ORDER — SODIUM CHLORIDE, SODIUM LACTATE, POTASSIUM CHLORIDE, CALCIUM CHLORIDE 600; 310; 30; 20 MG/100ML; MG/100ML; MG/100ML; MG/100ML
INJECTION, SOLUTION INTRAVENOUS CONTINUOUS
Status: DISCONTINUED | OUTPATIENT
Start: 2024-03-12 | End: 2024-03-12 | Stop reason: HOSPADM

## 2024-03-12 RX ORDER — FENTANYL CITRATE 50 UG/ML
100 INJECTION, SOLUTION INTRAMUSCULAR; INTRAVENOUS
Status: DISCONTINUED | OUTPATIENT
Start: 2024-03-12 | End: 2024-03-12 | Stop reason: HOSPADM

## 2024-03-12 RX ORDER — ONDANSETRON 4 MG/1
4 TABLET, ORALLY DISINTEGRATING ORAL EVERY 6 HOURS PRN
Status: DISCONTINUED | OUTPATIENT
Start: 2024-03-12 | End: 2024-03-12 | Stop reason: HOSPADM

## 2024-03-12 RX ORDER — FENTANYL/ROPIVACAINE/NS/PF 2MCG/ML-.1
PLASTIC BAG, INJECTION (ML) EPIDURAL
Status: DISCONTINUED | OUTPATIENT
Start: 2024-03-12 | End: 2024-03-12 | Stop reason: HOSPADM

## 2024-03-12 RX ORDER — TRANEXAMIC ACID 10 MG/ML
1 INJECTION, SOLUTION INTRAVENOUS EVERY 30 MIN PRN
Status: DISCONTINUED | OUTPATIENT
Start: 2024-03-12 | End: 2024-03-12 | Stop reason: HOSPADM

## 2024-03-12 RX ORDER — OXYTOCIN/0.9 % SODIUM CHLORIDE 30/500 ML
100-340 PLASTIC BAG, INJECTION (ML) INTRAVENOUS CONTINUOUS PRN
Status: DISCONTINUED | OUTPATIENT
Start: 2024-03-12 | End: 2024-03-14 | Stop reason: HOSPADM

## 2024-03-12 RX ORDER — NALOXONE HYDROCHLORIDE 0.4 MG/ML
0.2 INJECTION, SOLUTION INTRAMUSCULAR; INTRAVENOUS; SUBCUTANEOUS
Status: DISCONTINUED | OUTPATIENT
Start: 2024-03-12 | End: 2024-03-12 | Stop reason: HOSPADM

## 2024-03-12 RX ORDER — METOCLOPRAMIDE 10 MG/1
10 TABLET ORAL EVERY 6 HOURS PRN
Status: DISCONTINUED | OUTPATIENT
Start: 2024-03-12 | End: 2024-03-12 | Stop reason: HOSPADM

## 2024-03-12 RX ORDER — IBUPROFEN 800 MG/1
800 TABLET, FILM COATED ORAL EVERY 6 HOURS PRN
Status: DISCONTINUED | OUTPATIENT
Start: 2024-03-12 | End: 2024-03-13

## 2024-03-12 RX ORDER — MISOPROSTOL 200 UG/1
800 TABLET ORAL
Status: DISCONTINUED | OUTPATIENT
Start: 2024-03-12 | End: 2024-03-14 | Stop reason: HOSPADM

## 2024-03-12 RX ORDER — PENICILLIN G POTASSIUM 5000000 [IU]/1
5 INJECTION, POWDER, FOR SOLUTION INTRAMUSCULAR; INTRAVENOUS ONCE
Status: COMPLETED | OUTPATIENT
Start: 2024-03-12 | End: 2024-03-12

## 2024-03-12 RX ORDER — MODIFIED LANOLIN
OINTMENT (GRAM) TOPICAL
Status: DISCONTINUED | OUTPATIENT
Start: 2024-03-12 | End: 2024-03-14 | Stop reason: HOSPADM

## 2024-03-12 RX ORDER — FENTANYL CITRATE-0.9 % NACL/PF 10 MCG/ML
100 PLASTIC BAG, INJECTION (ML) INTRAVENOUS EVERY 5 MIN PRN
Status: DISCONTINUED | OUTPATIENT
Start: 2024-03-12 | End: 2024-03-12 | Stop reason: HOSPADM

## 2024-03-12 RX ORDER — LOPERAMIDE HCL 2 MG
2 CAPSULE ORAL
Status: DISCONTINUED | OUTPATIENT
Start: 2024-03-12 | End: 2024-03-12 | Stop reason: HOSPADM

## 2024-03-12 RX ORDER — PENICILLIN G 3000000 [IU]/50ML
3 INJECTION, SOLUTION INTRAVENOUS EVERY 4 HOURS
Status: DISCONTINUED | OUTPATIENT
Start: 2024-03-12 | End: 2024-03-12 | Stop reason: HOSPADM

## 2024-03-12 RX ORDER — DOCUSATE SODIUM 100 MG/1
100 CAPSULE, LIQUID FILLED ORAL DAILY
Status: DISCONTINUED | OUTPATIENT
Start: 2024-03-12 | End: 2024-03-14 | Stop reason: HOSPADM

## 2024-03-12 RX ORDER — NALOXONE HYDROCHLORIDE 0.4 MG/ML
0.4 INJECTION, SOLUTION INTRAMUSCULAR; INTRAVENOUS; SUBCUTANEOUS
Status: DISCONTINUED | OUTPATIENT
Start: 2024-03-12 | End: 2024-03-12 | Stop reason: HOSPADM

## 2024-03-12 RX ORDER — OXYTOCIN 10 [USP'U]/ML
10 INJECTION, SOLUTION INTRAMUSCULAR; INTRAVENOUS
Status: DISCONTINUED | OUTPATIENT
Start: 2024-03-12 | End: 2024-03-12 | Stop reason: HOSPADM

## 2024-03-12 RX ORDER — LOPERAMIDE HCL 2 MG
4 CAPSULE ORAL
Status: DISCONTINUED | OUTPATIENT
Start: 2024-03-12 | End: 2024-03-14 | Stop reason: HOSPADM

## 2024-03-12 RX ORDER — SODIUM CHLORIDE, SODIUM LACTATE, POTASSIUM CHLORIDE, CALCIUM CHLORIDE 600; 310; 30; 20 MG/100ML; MG/100ML; MG/100ML; MG/100ML
INJECTION, SOLUTION INTRAVENOUS CONTINUOUS PRN
Status: DISCONTINUED | OUTPATIENT
Start: 2024-03-12 | End: 2024-03-12 | Stop reason: HOSPADM

## 2024-03-12 RX ORDER — OXYTOCIN 10 [USP'U]/ML
10 INJECTION, SOLUTION INTRAMUSCULAR; INTRAVENOUS
Status: DISCONTINUED | OUTPATIENT
Start: 2024-03-12 | End: 2024-03-14 | Stop reason: HOSPADM

## 2024-03-12 RX ORDER — PROCHLORPERAZINE 25 MG
25 SUPPOSITORY, RECTAL RECTAL EVERY 12 HOURS PRN
Status: DISCONTINUED | OUTPATIENT
Start: 2024-03-12 | End: 2024-03-12 | Stop reason: HOSPADM

## 2024-03-12 RX ORDER — FENTANYL/ROPIVACAINE/NS/PF 2MCG/ML-.1
PLASTIC BAG, INJECTION (ML) EPIDURAL
Status: COMPLETED
Start: 2024-03-12 | End: 2024-03-12

## 2024-03-12 RX ORDER — KETOROLAC TROMETHAMINE 30 MG/ML
30 INJECTION, SOLUTION INTRAMUSCULAR; INTRAVENOUS
Status: DISCONTINUED | OUTPATIENT
Start: 2024-03-12 | End: 2024-03-14 | Stop reason: HOSPADM

## 2024-03-12 RX ORDER — OXYTOCIN 10 [USP'U]/ML
INJECTION, SOLUTION INTRAMUSCULAR; INTRAVENOUS
Status: DISCONTINUED
Start: 2024-03-12 | End: 2024-03-12 | Stop reason: WASHOUT

## 2024-03-12 RX ORDER — OXYTOCIN/0.9 % SODIUM CHLORIDE 30/500 ML
340 PLASTIC BAG, INJECTION (ML) INTRAVENOUS CONTINUOUS PRN
Status: DISCONTINUED | OUTPATIENT
Start: 2024-03-12 | End: 2024-03-12 | Stop reason: HOSPADM

## 2024-03-12 RX ORDER — MISOPROSTOL 200 UG/1
TABLET ORAL
Status: DISCONTINUED
Start: 2024-03-12 | End: 2024-03-12 | Stop reason: HOSPADM

## 2024-03-12 RX ORDER — LOPERAMIDE HCL 2 MG
2 CAPSULE ORAL
Status: DISCONTINUED | OUTPATIENT
Start: 2024-03-12 | End: 2024-03-14 | Stop reason: HOSPADM

## 2024-03-12 RX ORDER — BISACODYL 10 MG
10 SUPPOSITORY, RECTAL RECTAL DAILY PRN
Status: DISCONTINUED | OUTPATIENT
Start: 2024-03-12 | End: 2024-03-14 | Stop reason: HOSPADM

## 2024-03-12 RX ORDER — OXYTOCIN/0.9 % SODIUM CHLORIDE 30/500 ML
1-24 PLASTIC BAG, INJECTION (ML) INTRAVENOUS CONTINUOUS
Status: DISCONTINUED | OUTPATIENT
Start: 2024-03-12 | End: 2024-03-12 | Stop reason: HOSPADM

## 2024-03-12 RX ORDER — LIDOCAINE 40 MG/G
CREAM TOPICAL
Status: DISCONTINUED | OUTPATIENT
Start: 2024-03-12 | End: 2024-03-12 | Stop reason: HOSPADM

## 2024-03-12 RX ORDER — ACETAMINOPHEN 325 MG/1
650 TABLET ORAL EVERY 4 HOURS PRN
Status: DISCONTINUED | OUTPATIENT
Start: 2024-03-12 | End: 2024-03-14 | Stop reason: HOSPADM

## 2024-03-12 RX ORDER — FENTANYL CITRATE-0.9 % NACL/PF 10 MCG/ML
PLASTIC BAG, INJECTION (ML) INTRAVENOUS
Status: DISCONTINUED
Start: 2024-03-12 | End: 2024-03-12 | Stop reason: HOSPADM

## 2024-03-12 RX ORDER — METHYLERGONOVINE MALEATE 0.2 MG/ML
200 INJECTION INTRAVENOUS
Status: DISCONTINUED | OUTPATIENT
Start: 2024-03-12 | End: 2024-03-14 | Stop reason: HOSPADM

## 2024-03-12 RX ORDER — MISOPROSTOL 200 UG/1
400 TABLET ORAL
Status: DISCONTINUED | OUTPATIENT
Start: 2024-03-12 | End: 2024-03-14 | Stop reason: HOSPADM

## 2024-03-12 RX ORDER — OXYTOCIN/0.9 % SODIUM CHLORIDE 30/500 ML
340 PLASTIC BAG, INJECTION (ML) INTRAVENOUS CONTINUOUS PRN
Status: DISCONTINUED | OUTPATIENT
Start: 2024-03-12 | End: 2024-03-14 | Stop reason: HOSPADM

## 2024-03-12 RX ORDER — CITRIC ACID/SODIUM CITRATE 334-500MG
30 SOLUTION, ORAL ORAL
Status: DISCONTINUED | OUTPATIENT
Start: 2024-03-12 | End: 2024-03-12 | Stop reason: HOSPADM

## 2024-03-12 RX ORDER — PROCHLORPERAZINE MALEATE 10 MG
10 TABLET ORAL EVERY 6 HOURS PRN
Status: DISCONTINUED | OUTPATIENT
Start: 2024-03-12 | End: 2024-03-12 | Stop reason: HOSPADM

## 2024-03-12 RX ORDER — MISOPROSTOL 200 UG/1
400 TABLET ORAL
Status: DISCONTINUED | OUTPATIENT
Start: 2024-03-12 | End: 2024-03-12 | Stop reason: HOSPADM

## 2024-03-12 RX ORDER — NALBUPHINE HYDROCHLORIDE 20 MG/ML
2.5-5 INJECTION, SOLUTION INTRAMUSCULAR; INTRAVENOUS; SUBCUTANEOUS EVERY 6 HOURS PRN
Status: DISCONTINUED | OUTPATIENT
Start: 2024-03-12 | End: 2024-03-14 | Stop reason: HOSPADM

## 2024-03-12 RX ORDER — METOCLOPRAMIDE HYDROCHLORIDE 5 MG/ML
10 INJECTION INTRAMUSCULAR; INTRAVENOUS EVERY 6 HOURS PRN
Status: DISCONTINUED | OUTPATIENT
Start: 2024-03-12 | End: 2024-03-12 | Stop reason: HOSPADM

## 2024-03-12 RX ORDER — CARBOPROST TROMETHAMINE 250 UG/ML
250 INJECTION, SOLUTION INTRAMUSCULAR
Status: DISCONTINUED | OUTPATIENT
Start: 2024-03-12 | End: 2024-03-14 | Stop reason: HOSPADM

## 2024-03-12 RX ORDER — TRANEXAMIC ACID 10 MG/ML
1 INJECTION, SOLUTION INTRAVENOUS EVERY 30 MIN PRN
Status: DISCONTINUED | OUTPATIENT
Start: 2024-03-12 | End: 2024-03-14 | Stop reason: HOSPADM

## 2024-03-12 RX ORDER — METHYLERGONOVINE MALEATE 0.2 MG/ML
200 INJECTION INTRAVENOUS
Status: DISCONTINUED | OUTPATIENT
Start: 2024-03-12 | End: 2024-03-12 | Stop reason: HOSPADM

## 2024-03-12 RX ORDER — HYDROCORTISONE 25 MG/G
CREAM TOPICAL 3 TIMES DAILY PRN
Status: DISCONTINUED | OUTPATIENT
Start: 2024-03-12 | End: 2024-03-14 | Stop reason: HOSPADM

## 2024-03-12 RX ORDER — CARBOPROST TROMETHAMINE 250 UG/ML
250 INJECTION, SOLUTION INTRAMUSCULAR
Status: DISCONTINUED | OUTPATIENT
Start: 2024-03-12 | End: 2024-03-12 | Stop reason: HOSPADM

## 2024-03-12 RX ORDER — MISOPROSTOL 200 UG/1
800 TABLET ORAL
Status: DISCONTINUED | OUTPATIENT
Start: 2024-03-12 | End: 2024-03-12 | Stop reason: HOSPADM

## 2024-03-12 RX ORDER — LIDOCAINE HYDROCHLORIDE 10 MG/ML
INJECTION, SOLUTION EPIDURAL; INFILTRATION; INTRACAUDAL; PERINEURAL
Status: DISCONTINUED
Start: 2024-03-12 | End: 2024-03-12 | Stop reason: WASHOUT

## 2024-03-12 RX ORDER — LOPERAMIDE HCL 2 MG
4 CAPSULE ORAL
Status: DISCONTINUED | OUTPATIENT
Start: 2024-03-12 | End: 2024-03-12 | Stop reason: HOSPADM

## 2024-03-12 RX ORDER — FAMOTIDINE 10 MG
10 TABLET ORAL 2 TIMES DAILY
Status: DISCONTINUED | OUTPATIENT
Start: 2024-03-12 | End: 2024-03-14 | Stop reason: HOSPADM

## 2024-03-12 RX ADMIN — PENICILLIN G POTASSIUM 5 MILLION UNITS: 5000000 POWDER, FOR SOLUTION INTRAMUSCULAR; INTRAPLEURAL; INTRATHECAL; INTRAVENOUS at 10:05

## 2024-03-12 RX ADMIN — Medication: at 19:01

## 2024-03-12 RX ADMIN — PENICILLIN G 3 MILLION UNITS: 3000000 INJECTION, SOLUTION INTRAVENOUS at 13:59

## 2024-03-12 RX ADMIN — ONDANSETRON 4 MG: 2 INJECTION INTRAMUSCULAR; INTRAVENOUS at 17:17

## 2024-03-12 RX ADMIN — Medication 2 MILLI-UNITS/MIN: at 11:13

## 2024-03-12 RX ADMIN — FAMOTIDINE 10 MG: 10 TABLET ORAL at 11:26

## 2024-03-12 RX ADMIN — PENICILLIN G 3 MILLION UNITS: 3000000 INJECTION, SOLUTION INTRAVENOUS at 19:08

## 2024-03-12 RX ADMIN — MISOPROSTOL 800 MCG: 200 TABLET ORAL at 20:43

## 2024-03-12 RX ADMIN — SODIUM CHLORIDE, POTASSIUM CHLORIDE, SODIUM LACTATE AND CALCIUM CHLORIDE: 600; 310; 30; 20 INJECTION, SOLUTION INTRAVENOUS at 10:05

## 2024-03-12 RX ADMIN — Medication 5 ML: at 19:05

## 2024-03-12 RX ADMIN — ONDANSETRON 4 MG: 4 TABLET, ORALLY DISINTEGRATING ORAL at 10:03

## 2024-03-12 RX ADMIN — SODIUM CHLORIDE, POTASSIUM CHLORIDE, SODIUM LACTATE AND CALCIUM CHLORIDE 500 ML: 600; 310; 30; 20 INJECTION, SOLUTION INTRAVENOUS at 18:14

## 2024-03-12 RX ADMIN — DOCUSATE SODIUM 100 MG: 100 CAPSULE, LIQUID FILLED ORAL at 23:06

## 2024-03-12 ASSESSMENT — ACTIVITIES OF DAILY LIVING (ADL)
ADLS_ACUITY_SCORE: 18

## 2024-03-12 NOTE — H&P
ADMIT NOTE  =================  40w2d    Anjelica Ocampo is a 28 year old female with an Patient's last menstrual period was 2023. and Estimated Date of Delivery: Mar 10, 2024 is admitted to the [facility] on 3/12/2024 at 9:31 AM with for induction of labor.  Indication: elective d/t hx of fast labors and +GBS and desiring full dose of PCN.     HPI  ================    Contractions- not felt by patient  Fetal movement- active  ROM- no   Vaginal bleeding- none  GBS- positive  FOB- is involved, Dave  Other labor support- mother-in-law    Weight gain- 167 - 148 lbs, Total weight gain- 19 lbs  Height- 63  BMI- 25  First prenatal visit at 11 weeks, Total visits- 9    1st tri labs (23): Hgb 12.9, A1c 5.0%, neg HIV/Hep C Ab/Hep B SAg/RPR, rubella immune, O+, neg antibody screen, neg GC/CT, urine culture 1-9K GBS, pap NILM.     28 wk labs (23): Hgb 12.0, 1h GCT 56.    36 wk labs (24): Hgb 12.0, RPR neg  Genetics: normal quad screen (23)    Ultrasounds: Dating 23: 7w1d, COLTON 3/12/24.   10/24/23: normal anatomy, RVOT incompletely visualized, EFW 54th 5ile. 23: EFW 50th %ile, normal RVOT, prominent azygos? 24 fetal echo - normal.   Imms: TDaP and flu 23. Declines covid. RSV 1/15/24  Substance use: marijuana     PROBLEM LIST  =================  Patient Active Problem List    Diagnosis Date Noted    Multigravida 2018     Priority: High        27yo . COLTON 3/10/24 by LMP(23), consistent with 1st trimester ultrasound. Hospital: [place]. Pregnancy complicated by marijuana use, GBS bacteruria, measuring S < D with normal growth ultrasound at 33 weeks.       1st tri labs (23): Hgb 12.9, A1c 5.0%, neg HIV/Hep C Ab/Hep B SAg/RPR, rubella immune, O+, neg antibody screen, neg GC/CT, urine culture 1-9K GBS (likely vaginal colonization), normal pap.    28 wk labs (23): Hgb 12.0, 1h GCT 56.   36 wk labs (24): Hgb 12.0, RPR neg   Genetics: normal  quad screen (9/21/23)   Ultrasounds: Dating 7/6/23: 7w1d, COLTON 3/12/24. 10/24/23: normal anatomy, RVOT incompletely visualized, EFW 54th 5ile. 1/24/23: EFW 50th %ile, normal RVOT, prominent azygos? 2/1/24 fetal echo - normal.    Imms: TDaP and flu 12/11/23. Declines covid. RSV 1/15/24   Substance use: marijuana      Labor preferences: partner and mother-in-law will be present, prefers no pain medication (possibly nitrous)   Breastfeeding: yes, has not  previously. Has pump.    PP contraception: condoms   Infant clinic: [facility]      Last Assessment & Plan:    Formatting of this note might be different from the original.   Membranes swept. Pt desiring IOL. She has history of fast deliveries after ROM and is GBS positive. Called [facility] and scheduled IOL for Tuesday, March 12th at 7:30am. Advised pt to call 1 hour prior to confirm time. Follow-up 1-2 weeks postpartum. Contact clinic if concerns in the interim.      Pregnancy 03/12/2024     Priority: Medium    Labor and delivery indication for care or intervention 03/06/2024     Priority: Medium    GBS carrier 08/28/2023     Priority: Medium    Encounter for triage in pregnant patient 01/09/2018     Priority: Medium    Vaginal delivery 02/10/2017     Priority: Medium    Cannabis abuse 08/29/2016     Priority: Medium    Acne 05/11/2010     Priority: Medium    Chronic gastritis      Priority: Medium       HISTORIES  ============  No Known Allergies  Past Medical History:   Diagnosis Date    Chronic gastritis      Past Surgical History:   Procedure Laterality Date    NO HISTORY OF SURGERY     .  Family History   Problem Relation Age of Onset    Thyroid Disease Mother     Diabetes Maternal Grandmother     Breast Cancer Maternal Grandmother     Cancer Maternal Grandmother     Cerebrovascular Disease Maternal Grandmother     Hypertension Other      Social History     Tobacco Use    Smoking status: Never     Passive exposure: Yes    Smokeless tobacco: Never    Tobacco  comments:     mom smokes    Substance Use Topics    Alcohol use: Not Currently     Alcohol/week: 0.8 standard drinks of alcohol     Types: 1 Shots of liquor per week     OB History    Para Term  AB Living   4 2 2 0 1 2   SAB IAB Ectopic Multiple Live Births   1 0 0 0 2      # Outcome Date GA Lbr Vamsi/2nd Weight Sex Delivery Anes PTL Lv   4 Current            3 Term 18 40w0d  3.175 kg (7 lb) F Vag-Spont None  DEVENDRA   2 Term 02/10/17   3.402 kg (7 lb 8 oz) M Vag-Spont None  DEVENDRA   1 SAB                 LABS:   ===========  Prenatal Labs:  Rhogam not indicated   Lab Results   Component Value Date    ABO O 2012    RH  Pos 2012    AS Neg 2012    HEPBANG Nonreactive 10/30/2017    RUBELLAABIGG Immune 10/30/2017    HGB 12.1 2023     Rubella immune  Lab Results   Component Value Date    GBS Negative 2017     Other labs:  No results found for this or any previous visit (from the past 24 hour(s)).    ROS  =========  Pt denies significant respiratory, cardiovacular, GI, or muscular/skeletalcomplaints.    See RN data base ROS.       PHYSICAL EXAM:  ===============  /80 (BP Location: Right arm, Patient Position: Semi-Barone's, Cuff Size: Adult Regular)   Temp 98.4  F (36.9  C) (Oral)   Resp 16   LMP 2023   General appearance: comfortable  GENERAL APPEARANCE: healthy, alert and no distress  RESP: lungs clear to auscultation - no rales, rhonchi or wheezes  CV: regular rates and rhythm, normal S1 S2, no S3 or S4 and no murmur,and no varicosities  ABDOMEN:  soft, nontender, no epigastric pain  SKIN: no suspicious lesions or rashes  NEURO: Denies headache, blurred vision, other vision changes  PSYCH: mentation appears normal. and affect normal/bright  Legs: Reflexes normal bilaterally     Abdomen: gravid, vertex fetus per Leopold's, non-tender between contractions.   Cephalic presentation confirmed by BSUS  EFW-  7.5 lbs.   CONTACTIONS: irreg, mild, most not felt  FETAL  HEART TONES: continuous EFM- baseline 120 with moderate variability and positive accelerations. No decelerations.  PELVIC EXAM: 3/ 50%/ Mid/ average/ -1  WOOD SCORE: 6  BLOODY SHOW: no   ROM:no  FLUID: none  AMNISURE: not done    ASSESSMENT:  ==============  IUP @ 40w2d admitted for induction of labor.  Indication: elective d/t hx of fast labor and +GBS wanting full dose of PCN  Fetal Heart Rate - category one  GBS- positive    Patient Active Problem List   Diagnosis    Chronic gastritis    Acne    Encounter for triage in pregnant patient    Multigravida    Vaginal delivery    Labor and delivery indication for care or intervention    GBS carrier    Cannabis abuse    Pregnancy        PLAN:  ===========  Admit - see IP orders  pain medication options of nitrous oxide, fentanyl IV and epidural anesthesia reviewed with pt. Pt is interested in avoiding epidural  Labor induction with Pitocin risks and benefits reviewed with pt. Agreeable to plan  Prophylactic IV antibiotic for positive GBS status reviewed with pt. Agreeable to PCN.  Ambulation, hydration, position changes, birthing ball and tub options to facilitate labor reviewed with pt .  Anticipate   IKE Darden CNM

## 2024-03-12 NOTE — PROVIDER NOTIFICATION
03/12/24 1800   Provider Notification   Provider Name/Title A Page CNM   Method of Notification Electronic Page   Request Evaluate - Remote   Notification Reason Patient Request     Patient requesting epidural for pain management.

## 2024-03-12 NOTE — PROGRESS NOTES
S:   Patient reports pressure, an urge to push and some shakiness. Would like a cervical exam before getting an epidural    O:  Blood pressure (!) 141/88, temperature 97.5  F (36.4  C), temperature source Oral, resp. rate 16, last menstrual period 06/04/2023, not currently breastfeeding.      Baseline rate 130, normal  Variability moderate  Accelerations present   Decelerations present, deceleration type: variable, deceleration frequency: intermittent. Are the decelerations significant? {No      CONTRACTIONS: every 2.5-5 minutes.  Palpate: moderate  Pitocin- 8 mu/min.,  Antibiotics- PCN      ROM: clear fluid  PELVIC EXAM:PELVIC EXAM: 3.5/ 80%/ Mid/ soft/ -1   # Pain Assessment:      3/12/2024     5:39 PM   Current Pain Score   Patient currently in pain? yes   - Anjelica is experiencing pain due to labor. Pain management was discussed with Anjelica and her family and the plan was created in a collaborative fashion.  Anjelica's response to the current recommendations: engaged  - Plans epidural      Assessment: EFM interpretation suggests absence of concern for fetal metabolic acidemia at this time due to accelerations present, heart rate: normal baseline, and variability: moderate     The interventions currently taken to improve the fetal heart rate tracing and fetal oxygenation are: maternal positioning    Labor course:  3/12:   0900: 3/50/-2, gilbert 6, pitocin ordered  1005: PCN #1  1113: pitocin started  1359: 2nd dose of PCN  1540: AROM, clear fluid SVE 3.5/50%/anterior/soft, pitocin @8 units  1806:requesting an epidural    A:  ==============  Anjelica Ocampo  28 year old   IUP @ 40w2d early labor   Fetal Heart rate tracing  category two over the last 30 minutes  GBS- positive    Patient Active Problem List   Diagnosis    Chronic gastritis    Acne    Encounter for triage in pregnant patient    Multigravida    Vaginal delivery    Labor and delivery indication for care or intervention    GBS carrier     "Cannabis abuse    Pregnancy          P:  ===========  Pain medication -anesthesiologist at bedside to discuss epidural   Prophylactic antibiotic for + GBS status  Anticipate   Elevated blood pressure when patient was sitting up for an epidural. Will continue monitoring  Reevaluate in 2-4 hours/PRN      I, Samara RENE, am serving as a scribe to document services personally performed by CNM based on the provider's statements to me.\" Samara RENE  I agree with the PFSH and ROS as completed by the student, except for changes made by me. The remainder of the encounter scribed by the student. The scribed note accurately reflects my personal services and decisions made by me.  Dariana Pelletier, RONEN, CNM, APRN           "

## 2024-03-12 NOTE — PROGRESS NOTES
Anjelica is here for IOL for GBS positive status and h/o fast labors. She had two vaginal deliveries. She is unaware of contractions and denies pain. Continuous EFM as per order (see flowsheet). VSS. She complained of nausea and it was relieved with Zofran. Received order for famotidine for c/o heartburn. Denies headache, blurry vision, vaginal discharge or leaking.     Labor supported with the use of birthing aids and labor positions. Education provided on different labor positions to progress labor. Abdominal lift and tuck tried with partner at bedside. She tolerated flying cowgirl position in bed.     IOL started with pitocin at 2 units at 1113. She received first dose of penicillin for positive GBS status at 1005.    Report given to Janessa Funk RN at 1120. Anticipate .

## 2024-03-12 NOTE — PROGRESS NOTES
Clinic Visit Summary    May 10, 2018      DOMINGO NICOLE Description:  Male YOB: 1962   MRN: [id number] Provider:  Clemente Robbins M.D.    Primary Physician:  Ansley Cárdenas MD  Referring Physician:  Ortiz Eden MD     Insurance Information  [facility] [id number] 1/1/2018     Reason for Visit  Consultation Appointment     Health History        Vitals (last recorded)  Heading     Test Name B/P P Height (in) (inches) Height (cm) (cm) Weight (lb) (lb) Weight (kg) (kg) T (C)   5/10/2018 1:22 /73 83 69 175.26 170.64 77.4 36.1     Allergies (as reported by patient)  Allergy Type Name Reaction   Medication Allergy penicillin V potassium Rash   Medication Allergy oxaliplatin Anaphylactic      Current Medications (as reported by patient)  Drug Name Dose Strength Route   gabapentin 1 tab 300 mg Oral   Norco [hydrocodone-acetaminophen] 1 tab  mg Oral   Zofran [ondansetron HCl] 1 tab 8 mg Oral   MuGard [gly-carb h-poly a-pot hydrox] 5 ml   Mucous Membrane   Magic MW (Benadryl/maalox/lidocaine) [Benadryl/maalox/lidocaine] 5 ml            Medications prescribed today  Date Description Ordering Physician      Orders  Date Description Ordering Physician     Next Visit Information  Appointment Date Appointment Time Activity   5/10/2018 12:45 PM Registration   5/10/2018 1:00 PM RN Consult-NP   5/10/2018 1:30 PM Consult Outpatient     Special instructions            Reminders    · If you did not schedule your follow up appointment at the time of your visit, please call the department at [phone].  · Please provide a copy of this summary of care to your other providers.  · Please remember to bring the following items to your next appointment:  o Current medications or a list of your current medications.  o Insurance card and a photo ID.  § Please bring insurance and ID cards with you every time you come.  For safety and billing reasons, we must have copies of these  S:   Patient sitting on a birthing ball. Reports mild back pain and is able to cope well with the contractions. Declines heat packs at this time. She is requesting for her bag of water to be broken.    O:  Blood pressure 128/87, temperature 97.4  F (36.3  C), temperature source Oral, resp. rate 18, last menstrual period 06/04/2023, not currently breastfeeding.      Baseline rate 130, normal,   Variability moderate,   Accelerations present,   Decelerations not present      CONTRACTIONS: every 1.5-5.5 minutes. AROM performed  Palpate: moderate  Pitocin- 8 mu/min.,  Antibiotics- PCN, adequately treated      ROM: not ruptured  PELVIC EXAM:PELVIC EXAM: 3.5/ 50%/ Anterior/ soft/ -1, gilbert score of 9  # Pain Assessment:      3/12/2024     1:20 PM   Current Pain Score   Patient currently in pain? yes   - Anjelica is experiencing pain due to contractions. Pain management was discussed with Anjelica and her family and the plan was created in a collaborative fashion.  Anjelica's response to the current recommendations: engaged  - comfort measures      Assessment: EFM interpretation suggests absence of concern for fetal metabolic acidemia at this time due to accelerations present, heart rate: normal baseline, and variability: moderate    Labor course:  0900: 3/50/-2, gilbert 6, pitocin ordered  1005: PCN #1  1113: pitocin started    A:  ==============  Anjleica MARGARITO Ocampo  28 year old   IUP @ 40w2d Elective IOL   Fetal Heart rate tracing  category one over the last 20 minutes  GBS- positive s/p 2 doses of PCN    Patient Active Problem List   Diagnosis    Chronic gastritis    Acne    Encounter for triage in pregnant patient    Multigravida    Vaginal delivery    Labor and delivery indication for care or intervention    GBS carrier    Cannabis abuse    Pregnancy          P:  ===========  -Comfort measures prn   -Prophylactic antibiotic for + GBS status  -Reviewed r/b/a of amniotomy and patient is agreeable. Amniotomy  "performed for clear fluid  -Anticipate   -Labor induction with Pitocin  -Reevaluate in 2-4 hours/PRN     I, Samara RENE, am serving as a scribe to document services personally performed by CNM based on the provider's statements to me.\" Samara RENE    I agree with the PFSH and ROS as completed by the student, except for changes made by me. The remainder of the encounter scribed by the student. The scribed note accurately reflects my personal services and decisions made by me.  Dariana Pelletier, RONEN, CNM, APRN    " documents in your medical record and we must verify your identity on a regular basis.  o Primary care physician referral, if applicable.  § If you are a member of an HMO or PPO that requires a referral before receiving specialty care, please obtain the referral from your primary care physician (PCP) prior to your appointment.  Please contact your insurance provider to learn if your coverage requires referrals to a specialist. If you do not have a valid referral, you may be asked to reschedule your visit.  Some referrals are valid for a certain number of weeks or visits; please keep track of how many weeks or visits have passed since obtaining the referral so that you will know when to ask your PCP for a new referral.  Obtaining referrals is your responsibility.  Your insurance provider will likely require you to pay the full cost of visits without a valid referral.        This summary document is based solely upon information made available to the [facility] staff as of 05/10/18.  Please notify us of additional information related to your care at [phone].

## 2024-03-12 NOTE — PROGRESS NOTES
Data: Patient admitted to room 444 at 0750. Patient is a . Prenatal record reviewed.   OB History    Para Term  AB Living   4 2 2 0 1 2   SAB IAB Ectopic Multiple Live Births   1 0 0 0 2      # Outcome Date GA Lbr Vamsi/2nd Weight Sex Delivery Anes PTL Lv   4 Current            3 Term 18 40w0d  3.175 kg (7 lb) F Vag-Spont None  DEVENDRA   2 Term 02/10/17   3.402 kg (7 lb 8 oz) M Vag-Spont None  DEVENDRA   1 SAB            .  Medical History:   Past Medical History:   Diagnosis Date    Chronic gastritis    .  Gestational age 40w2d. Vital signs per doc flowsheet. Fetal movement present. Patient reports Induction Of Labor  as reason for admission. Support persons partner Dave present.    Action: Verbal consent for EFM, external fetal monitors applied. Admission assessment completed. Patient and support persons educated on labor process. Patient instructed to report change in fetal movement, contractions, vaginal leaking of fluid or bleeding, abdominal pain, or any concerns related to the pregnancy to her nurse/physician. Patient oriented to room, call light in reach.     Response: CNMARCELLO Pelletier informed of patient arrival. Plan per provider is to start IV antibiotic for positive GBS status and start IV pitocin for induction after first dose of IV antibiotic due to history of fast labor. SVE 3/50/-2 in admission. Cephalic presentation confirmed via bedside US. Patient verbalized understanding of education and verbalized agreement with plan. Patient is unaware of contractions and denies pain. She is coping with labor via use of birthing ball and soothing music.

## 2024-03-12 NOTE — PROGRESS NOTES
S;General appearance: comfortable  Feeling reg contx, needed to breath through some of them. On birth ball, rocking  Support: Mother-in-law at bedside, Dave has been in and out.  Does not plan epidural      Blood pressure 135/83, temperature 98.3  F (36.8  C), temperature source Oral, resp. rate 16, last menstrual period 2023, not currently breastfeeding.      Baseline rate 135, normal  Variability moderate  Accelerations present   Decelerations not present   CONTRACTIONS: Contractions every 2-5 minutes.  Palpate: mild  Pitocin- 4 mu/min.,  Antibiotics- PCN      ROM: not ruptured  PELVIC EXAM:deferred  # Pain Assessment:      3/12/2024    12:25 PM   Current Pain Score   Patient currently in pain? yes   Anjelica andersen pain level was assessed and she currently denies pain.        Assessment: EFM interpretation suggests absence of concern for fetal metabolic acidemia at this time due to accels and mod variability    Labor course:  15: 3/50/-2, gilbert 6  1000: PCN  1115: pitocin start    ASSESSMENT:  ==============  Anjelica Ocampo  28 year old  female  Estimated Date of Delivery: Mar 10, 2024  IUP @ 40w2d IOL elective  Fetal Heart rate tracing  category one over the last 60 minutes    GBS- positive  Pitocin per protocol  Patient Active Problem List   Diagnosis    Chronic gastritis    Acne    Encounter for triage in pregnant patient    Multigravida    Vaginal delivery    Labor and delivery indication for care or intervention    GBS carrier    Cannabis abuse    Pregnancy          PLAN:  ===========  comfort measures prn   Prophylactic antibiotic for + GBS status  Anticipate   Labor induction with Pitocin  reevaluate in 2-4 hours/PRN        Dariana Pelletier APRN CNM,CNM, APRN

## 2024-03-13 LAB
ALBUMIN SERPL BCG-MCNC: 3.2 G/DL (ref 3.5–5.2)
ALP SERPL-CCNC: 149 U/L (ref 40–150)
ALT SERPL W P-5'-P-CCNC: 7 U/L (ref 0–50)
ANION GAP SERPL CALCULATED.3IONS-SCNC: 10 MMOL/L (ref 7–15)
AST SERPL W P-5'-P-CCNC: 21 U/L (ref 0–45)
BILIRUB SERPL-MCNC: 0.5 MG/DL
BUN SERPL-MCNC: 5.8 MG/DL (ref 6–20)
CALCIUM SERPL-MCNC: 8.7 MG/DL (ref 8.6–10)
CHLORIDE SERPL-SCNC: 104 MMOL/L (ref 98–107)
CREAT SERPL-MCNC: 0.66 MG/DL (ref 0.51–0.95)
DEPRECATED HCO3 PLAS-SCNC: 20 MMOL/L (ref 22–29)
EGFRCR SERPLBLD CKD-EPI 2021: >90 ML/MIN/1.73M2
ERYTHROCYTE [DISTWIDTH] IN BLOOD BY AUTOMATED COUNT: 13.2 % (ref 10–15)
GLUCOSE SERPL-MCNC: 105 MG/DL (ref 70–99)
HCT VFR BLD AUTO: 31 % (ref 35–47)
HGB BLD-MCNC: 10.4 G/DL (ref 11.7–15.7)
MCH RBC QN AUTO: 29.4 PG (ref 26.5–33)
MCHC RBC AUTO-ENTMCNC: 33.5 G/DL (ref 31.5–36.5)
MCV RBC AUTO: 88 FL (ref 78–100)
PLATELET # BLD AUTO: 249 10E3/UL (ref 150–450)
POTASSIUM SERPL-SCNC: 4 MMOL/L (ref 3.4–5.3)
PROT SERPL-MCNC: 6.2 G/DL (ref 6.4–8.3)
RBC # BLD AUTO: 3.54 10E6/UL (ref 3.8–5.2)
SODIUM SERPL-SCNC: 134 MMOL/L (ref 135–145)
URATE SERPL-MCNC: 6 MG/DL (ref 2.4–5.7)
WBC # BLD AUTO: 17 10E3/UL (ref 4–11)

## 2024-03-13 PROCEDURE — 250N000013 HC RX MED GY IP 250 OP 250 PS 637: Performed by: ADVANCED PRACTICE MIDWIFE

## 2024-03-13 PROCEDURE — 36415 COLL VENOUS BLD VENIPUNCTURE: CPT | Performed by: ADVANCED PRACTICE MIDWIFE

## 2024-03-13 PROCEDURE — 99232 SBSQ HOSP IP/OBS MODERATE 35: CPT | Performed by: ADVANCED PRACTICE MIDWIFE

## 2024-03-13 PROCEDURE — 85027 COMPLETE CBC AUTOMATED: CPT | Performed by: ADVANCED PRACTICE MIDWIFE

## 2024-03-13 PROCEDURE — 80053 COMPREHEN METABOLIC PANEL: CPT | Performed by: ADVANCED PRACTICE MIDWIFE

## 2024-03-13 PROCEDURE — 84550 ASSAY OF BLOOD/URIC ACID: CPT | Performed by: ADVANCED PRACTICE MIDWIFE

## 2024-03-13 PROCEDURE — 120N000002 HC R&B MED SURG/OB UMMC

## 2024-03-13 PROCEDURE — 250N000011 HC RX IP 250 OP 636: Performed by: ADVANCED PRACTICE MIDWIFE

## 2024-03-13 RX ORDER — NIFEDIPINE 30 MG/1
30 TABLET, EXTENDED RELEASE ORAL DAILY
Status: DISCONTINUED | OUTPATIENT
Start: 2024-03-13 | End: 2024-03-14 | Stop reason: HOSPADM

## 2024-03-13 RX ORDER — IBUPROFEN 100 MG/5ML
800 SUSPENSION, ORAL (FINAL DOSE FORM) ORAL EVERY 6 HOURS PRN
Status: DISCONTINUED | OUTPATIENT
Start: 2024-03-13 | End: 2024-03-14 | Stop reason: HOSPADM

## 2024-03-13 RX ORDER — OXYCODONE HYDROCHLORIDE 5 MG/1
5 TABLET ORAL ONCE
Status: COMPLETED | OUTPATIENT
Start: 2024-03-13 | End: 2024-03-13

## 2024-03-13 RX ORDER — NALOXONE HYDROCHLORIDE 1 MG/ML
0.2 INJECTION INTRAMUSCULAR; INTRAVENOUS; SUBCUTANEOUS
Status: DISCONTINUED | OUTPATIENT
Start: 2024-03-13 | End: 2024-03-14 | Stop reason: HOSPADM

## 2024-03-13 RX ORDER — NALOXONE HYDROCHLORIDE 1 MG/ML
0.4 INJECTION INTRAMUSCULAR; INTRAVENOUS; SUBCUTANEOUS
Status: DISCONTINUED | OUTPATIENT
Start: 2024-03-13 | End: 2024-03-14 | Stop reason: HOSPADM

## 2024-03-13 RX ADMIN — DOCUSATE SODIUM 100 MG: 100 CAPSULE, LIQUID FILLED ORAL at 07:31

## 2024-03-13 RX ADMIN — ACETAMINOPHEN 650 MG: 325 TABLET, FILM COATED ORAL at 01:29

## 2024-03-13 RX ADMIN — IBUPROFEN 800 MG: 200 SUSPENSION ORAL at 07:30

## 2024-03-13 RX ADMIN — NIFEDIPINE 30 MG: 30 TABLET, FILM COATED, EXTENDED RELEASE ORAL at 04:00

## 2024-03-13 RX ADMIN — ACETAMINOPHEN 650 MG: 325 TABLET, FILM COATED ORAL at 12:33

## 2024-03-13 RX ADMIN — ACETAMINOPHEN 650 MG: 325 TABLET, FILM COATED ORAL at 23:02

## 2024-03-13 RX ADMIN — IBUPROFEN 800 MG: 200 SUSPENSION ORAL at 13:53

## 2024-03-13 RX ADMIN — ACETAMINOPHEN 650 MG: 325 TABLET, FILM COATED ORAL at 07:30

## 2024-03-13 RX ADMIN — KETOROLAC TROMETHAMINE 30 MG: 30 INJECTION, SOLUTION INTRAMUSCULAR at 01:34

## 2024-03-13 RX ADMIN — OXYCODONE HYDROCHLORIDE 5 MG: 5 TABLET ORAL at 04:00

## 2024-03-13 RX ADMIN — ACETAMINOPHEN 650 MG: 325 TABLET, FILM COATED ORAL at 17:07

## 2024-03-13 RX ADMIN — IBUPROFEN 800 MG: 200 SUSPENSION ORAL at 21:31

## 2024-03-13 ASSESSMENT — ACTIVITIES OF DAILY LIVING (ADL)
ADLS_ACUITY_SCORE: 18

## 2024-03-13 NOTE — PROVIDER NOTIFICATION
CNM INFORMED OF LAST BP AND PT'S COMPLAINT OF MODERATE CRAMPING, WITHOUT RELIEF FROM HOT PACKS, TYLENOL AND TORADOL.

## 2024-03-13 NOTE — PROGRESS NOTES
Estherera Page from RN notifying CNM of elevated blood pressure 138/93, now 4 hours since last non-severe range blood pressure.  HELLP labs ordered. Orders placed to start Nifedipine.   Patient also reporting increased uterine cramping.  Has voided and used Tylenol and Ibuprofen with warm packs.  RN asking for order for Oxycodone.  CNM encouraged tub soak at this time.  One time order of Oxycodone ordered.  IKE Valentin CNM

## 2024-03-13 NOTE — PROVIDER NOTIFICATION
03/12/24 2036   Provider Notification   Provider Name/Title Meena Loya CNM   Method of Notification Phone   Request Evaluate in Person   Notification Reason Status Update;Other     Pt has had large blood clot passed, with additional blood loss of 270 ml and continuous large gushes.  LASHELL Fajardo came at 2037 and assessed pt. Miso 800 mg rectal was given.

## 2024-03-13 NOTE — ANESTHESIA PROCEDURE NOTES
"Epidural catheter Procedure Note    Pre-Procedure   Staff -        Anesthesiologist:  Sandie Palmer MD       Resident/Fellow: Idalia Torres MD       Performed By: resident       Location: OB       Pre-Anesthestic Checklist: patient identified, IV checked, risks and benefits discussed, informed consent, monitors and equipment checked, pre-op evaluation, at physician/surgeon's request and post-op pain management  Timeout:       Correct Patient: Yes        Correct Procedure: Yes        Correct Site: Yes        Correct Position: Yes   Procedure Documentation  Procedure: epidural catheter       Diagnosis: labor analgesia       Patient Position: sitting       Skin prep: Chloraprep       Local skin infiltrated with 3 mL of 1% lidocaine.        Insertion Site: L3-4. (midline approach).       Technique: LORT saline        PAVEL at 6 cm.       Needle Type: [employer] needle       Needle Gauge: 17.        Needle Length (Inches): 3.5        Catheter: 19 G.          Catheter threaded easily.         5 cm epidural space.         Threaded 11 cm at skin.         # of attempts: 1 and  # of redirects:  1    Assessment/Narrative         Paresthesias: No.       Test dose of 3 mL lidocaine 1.5% w/ 1:200,000 epinephrine at.         Test dose negative, 3 minutes after injection, for signs of intravascular, subdural, or intrathecal injection.       Insertion/Infusion Method: LORT saline       No aspiration negative for Heme or CSF via Epidural Catheter.    Medication(s) Administered   0.125% bupivacaine (Epidural) - EPIDURAL   5 mL - 3/12/2024 7:05:00 PM  0.1% ropivacaine + 2 mcg/mL fentaNYL in NS - EPIDURAL   3 mL - 3/12/2024 7:06:00 PM    FOR [facility] ([facility]) ONLY:   Pain Team Contact information: please page the Pain Team Via [employer] Easy. Search \"Pain\". During daytime hours, please page the attending first. At night please page the resident first.      "

## 2024-03-13 NOTE — PLAN OF CARE
Data: Vital signs within normal limits. Postpartum checks within normal limits - see flow record. Patient eating and drinking normally. Patient able to empty bladder independently and is up ambulating. No apparent signs of infection.  laceration  healing well. Patient performing self cares and is able to care for infant.  Action: Patient medicated during the shift for cramping. One dose of Oxycodone given.See MAR. Patient reassessed within 1 hour after each medication and pain was improved - patient stated she was comfortable. Patient education done about analgesics, Nifedipine, labs, and rest. See flow record.  Response: Positive attachment behaviors observed with infant. Support persons are present.   Plan: Anticipate discharge on 3/14/24.Goal Outcome Evaluation:progressing

## 2024-03-13 NOTE — PROGRESS NOTES
Anjelica Ocampo      MRN#: [id number]  Age: 28 year old      YOB: 1995      PPD #1 S/P   Anjelica Ocampo is integrating her birth experience. She is up and active in the room independently, is voiding and ambulating without difficulty without dizziness or calf pain. Tolerating normal diet, has passed flatus, as not had a BM. Voiding, ambulating, without issue. Bleeding is light without clots. Perineal pain is is relieved by Ibuprophen. The perineum is intact.     Episode of severe abdominal pain overnight, unable to sleep and not managed with ibuprofen and tylenol, emptying bladder, warm compress. Received 1x dose of oxycodone and able to sleep, reports less pain this morning. Will let us know if the cramping pain becomes severe again and we will reassess    Baby boy, Dave, is formula feeding on cue. He has not urinated yet.  Postpartum contraception plans: condoms, not desiring birth control but will think about options leading up to 6wk. This is her first baby with Dave (Sr) but she knows she does not want to have more children. May be open to Paragard non hormonal IUD, has not considered permanent sterilization for herself or Dave  Support at home identified: partner Dave (who currently is not working) and her mother is supportive and lives three blocks away.   She reports no depression/anxiety.   Denies history of postpartum hypertension. Denies HA, visual changes, RUQ      SIGNIFICANT PROBLEMS:  Patient Active Problem List    Diagnosis Date Noted    Pregnancy 2024     Priority: Medium     (normal spontaneous vaginal delivery) 2024     Priority: Medium    Labor and delivery indication for care or intervention 2024     Priority: Medium    GBS carrier 2023     Priority: Medium    Multigravida 2018     Priority: Medium        27yo . COLTON 3/10/24 by LMP(23), consistent with 1st trimester ultrasound. Hospital: [place].  Pregnancy complicated by marijuana use, GBS bacteruria, measuring S < D with normal growth ultrasound at 33 weeks.       1st tri labs (8/24/23): Hgb 12.9, A1c 5.0%, neg HIV/Hep C Ab/Hep B SAg/RPR, rubella immune, O+, neg antibody screen, neg GC/CT, urine culture 1-9K GBS (likely vaginal colonization), normal pap.    28 wk labs (12/11/23): Hgb 12.0, 1h GCT 56.   36 wk labs (2/12/24): Hgb 12.0, RPR neg   Genetics: normal quad screen (9/21/23)   Ultrasounds: Dating 7/6/23: 7w1d, COLTON 3/12/24. 10/24/23: normal anatomy, RVOT incompletely visualized, EFW 54th 5ile. 1/24/23: EFW 50th %ile, normal RVOT, prominent azygos? 2/1/24 fetal echo - normal.    Imms: TDaP and flu 12/11/23. Declines covid. RSV 1/15/24   Substance use: marijuana      Labor preferences: partner and mother-in-law will be present, prefers no pain medication (possibly nitrous)   Breastfeeding: yes, has not  previously. Has pump.    PP contraception: condoms   Infant clinic: B      Last Assessment & Plan:    Formatting of this note might be different from the original.   Membranes swept. Pt desiring IOL. She has history of fast deliveries after ROM and is GBS positive. Called [facility] and scheduled IOL for Tuesday, March 12th at 7:30am. Advised pt to call 1 hour prior to confirm time. Follow-up 1-2 weeks postpartum. Contact clinic if concerns in the interim.      Encounter for triage in pregnant patient 01/09/2018     Priority: Medium    Vaginal delivery 02/10/2017     Priority: Medium    Cannabis abuse 08/29/2016     Priority: Medium    Acne 05/11/2010     Priority: Medium    Chronic gastritis      Priority: Medium         PE:    Patient Vitals for the past 24 hrs:   BP Temp Temp src Pulse Resp SpO2   03/13/24 0840 122/68 97.7  F (36.5  C) Oral 71 -- --   03/13/24 0325 (!) 138/93 98.2  F (36.8  C) Oral 78 16 --   03/12/24 2250 125/79 97.9  F (36.6  C) Oral 71 16 99 %   03/12/24 2208 120/68 -- -- -- 16 98 %   03/12/24 2153 117/64 -- -- -- 16 99 %    03/12/24 2138 112/60 -- -- -- 16 98 %   03/12/24 2123 -- -- -- -- 16 --   03/12/24 2108 120/63 98.1  F (36.7  C) Oral -- 16 98 %   03/12/24 2053 127/71 -- -- -- 18 98 %   03/12/24 2023 128/60 -- -- -- 16 98 %   03/12/24 2008 132/62 97.4  F (36.3  C) Oral -- 16 99 %   03/12/24 2003 -- -- -- -- -- 99 %   03/12/24 1958 -- -- -- -- -- 99 %   03/12/24 1953 -- -- -- -- -- 99 %   03/12/24 1952 (!) 140/63 -- -- -- -- --   03/12/24 1913 130/62 -- -- -- -- 100 %   03/12/24 1911 126/59 -- -- -- -- --   03/12/24 1908 131/71 -- -- -- -- 100 %   03/12/24 1906 123/71 -- -- -- -- --   03/12/24 1904 130/73 -- -- -- -- --   03/12/24 1903 -- -- -- -- -- 100 %   03/12/24 1902 139/85 -- -- -- -- 100 %   03/12/24 1739 (!) 141/88 97.5  F (36.4  C) Oral -- 16 --   03/12/24 1536 128/87 -- -- -- 18 --   03/12/24 1516 -- 97.4  F (36.3  C) Oral -- -- --   03/12/24 1432 134/81 -- -- -- 16 --   03/12/24 1320 126/71 -- -- -- 16 --   03/12/24 1225 135/83 98.3  F (36.8  C) Oral -- 16 --   03/12/24 1120 109/58 98.4  F (36.9  C) Oral -- 16 --       NAD  Caridac- Regular rate and rhythm  Lungs- CTA bilat  Breasts: Soft, filling  Nipples: Intact, Non-tender  Abdomen: Soft, Non-tender      Uterus: Fundus Firm, Non-tender, located 1cm below the umbilicus   Lochia: Rubra, appropriate amount    Perineum:  Well-approximated, healing well  Lower Extremities: no Edema Bilateral      Recent Labs   Lab 03/13/24  0440 03/12/24  0940   HGB 10.4* 11.5*     Blood type   Lab Results   Component Value Date    ABO O 08/03/2012       Lab Results   Component Value Date    RH  Pos 08/03/2012     Rubella status - Immune  Urine protein/creatinine ratio not collected as pt is bleeding    Last Comprehensive Metabolic Panel:  Sodium   Date Value Ref Range Status   03/13/2024 134 (L) 135 - 145 mmol/L Final     Comment:     Reference intervals for this test were updated on 09/26/2023 to more accurately reflect our healthy population. There may be differences in the flagging  of prior results with similar values performed with this method. Interpretation of those prior results can be made in the context of the updated reference intervals.    12/10/2008 145 (H) 133 - 143 mmol/L Final     Potassium   Date Value Ref Range Status   03/13/2024 4.0 3.4 - 5.3 mmol/L Final   07/18/2021 3.6 3.4 - 5.3 mmol/L Final   12/10/2008 4.9 3.4 - 5.3 mmol/L Final     Chloride   Date Value Ref Range Status   03/13/2024 104 98 - 107 mmol/L Final   07/18/2021 121 (H) 94 - 109 mmol/L Final   12/10/2008 100 96 - 110 mmol/L Final     Carbon Dioxide   Date Value Ref Range Status   12/10/2008 28 20 - 32 mmol/L Final     Carbon Dioxide (CO2)   Date Value Ref Range Status   03/13/2024 20 (L) 22 - 29 mmol/L Final   07/18/2021 24 20 - 32 mmol/L Final     Anion Gap   Date Value Ref Range Status   03/13/2024 10 7 - 15 mmol/L Final   07/18/2021 3 3 - 14 mmol/L Final   12/10/2008 16 6 - 17 mmol/L Final     Glucose   Date Value Ref Range Status   03/13/2024 105 (H) 70 - 99 mg/dL Final   07/18/2021 103 (H) 70 - 99 mg/dL Final   12/10/2008 89 60 - 99 mg/dL Final     GLUCOSE BY METER POCT   Date Value Ref Range Status   07/18/2021 93 70 - 99 mg/dL Final     Urea Nitrogen   Date Value Ref Range Status   03/13/2024 5.8 (L) 6.0 - 20.0 mg/dL Final   07/18/2021 8 7 - 30 mg/dL Final   12/10/2008 10 5 - 24 mg/dL Final     Creatinine   Date Value Ref Range Status   03/13/2024 0.66 0.51 - 0.95 mg/dL Final   12/10/2008 0.66 0.39 - 0.73 mg/dL Final     Comment:     New IDMS-traceable calibration  beginning 5/1/08     GFR Estimate   Date Value Ref Range Status   03/13/2024 >90 >60 mL/min/1.73m2 Final   12/10/2008 GFR not calculated, patient <16 years old. mL/min/1.7m2 Final     Calcium   Date Value Ref Range Status   03/13/2024 8.7 8.6 - 10.0 mg/dL Final   12/10/2008 10.0 8.7 - 10.8 mg/dL Final     Bilirubin Total   Date Value Ref Range Status   03/13/2024 0.5 <=1.2 mg/dL Final   12/10/2008 0.5 0.2 - 1.3 mg/dL Final     Alkaline  Phosphatase   Date Value Ref Range Status   2024 149 40 - 150 U/L Final     Comment:     Reference intervals for this test were updated on 2023 to more accurately reflect our healthy population. There may be differences in the flagging of prior results with similar values performed with this method. Interpretation of those prior results can be made in the context of the updated reference intervals.   12/10/2008 369 105 - 420 U/L Final     ALT   Date Value Ref Range Status   2024 7 0 - 50 U/L Final     Comment:     Reference intervals for this test were updated on 2023 to more accurately reflect our healthy population. There may be differences in the flagging of prior results with similar values performed with this method. Interpretation of those prior results can be made in the context of the updated reference intervals.     12/10/2008 32 0 - 50 U/L Final     AST   Date Value Ref Range Status   2024 21 0 - 45 U/L Final     Comment:     Reference intervals for this test were updated on 2023 to more accurately reflect our healthy population. There may be differences in the flagging of prior results with similar values performed with this method. Interpretation of those prior results can be made in the context of the updated reference intervals.   12/10/2008 43 (H) 0 - 35 U/L Final         Assessment-   29 yo  Day 1 postpartum,    Pernieum intact   Complications:   - pain not well controlled - on PPD0   - PPH 1hr post delivery, clots evacuated. . S/P rectal miso     - Postpartum gestational hypertension, on Nifedipine 30mg, asymptomatic  Bottle feeding  Denies history of depression/anxiety  PP Contraceptive Plans: Condoms + undecided  Stable Post-partum day #1  Rhophylac not indicated      Plan:     - Reviewed BP and side effects of nifedipine, to watch for symptoms of both preeclampsia and hypotension. Discussed the HOPE-BP program and how to monitor BP at home  -  Routine Postpartum Management  - Encouraged Postpartum videos before discharge  - Anticipate discharge to home tomorrow  - Home visit ordered for BP check.  Teaching done: D/C Instructions: Nutrition/Activity, Birth Control Options, Warning Signs/When to Call: Excessive Bleeding, Infection, PP Depression, and RTC Clinic for PP Appointment    Postpartum warning s/s reviewed, including bleeding/clots, fever, mastitis, or depression  - RTC at 2/6wks, plans to see [facility] for postpartum care.     Reviewed PP BP guidelines    If patient is on HTN medication:  - Consider decreasing medication if BP is <130/80  - Continue medication if BP is between 130/80 and 140/90  -BP <100/<60 discontinue medication  - Increase medication, or add additional medication, if BP is  >140/90 (Refer to Internal Medicine if after 6 weeks pp)  - If BP is greater than or equal to 160/100 and patient has  acute symptoms, direct patient to Emergency Department      IKaylah, am serving as a scribe; to document services personally performed by Zayra Rubi CNM based on data collection and the provider's statements to me.     ANJU Oh      I agree with past family and social history and review of systems completed by the Medical/Advanced Practice Provider student except for changes made by me. The remainder of the encounter was performed by me and scribed by the student. The scribed note accurately reflects personal services and decisions made by me.    IKE Freire CNM

## 2024-03-13 NOTE — ANESTHESIA PREPROCEDURE EVALUATION
"Anesthesia Pre-Procedure Evaluation    Patient: Anjelica Ocampo   MRN: [id number] : 1995        Procedure : * No procedures listed *          Past Medical History:   Diagnosis Date    Chronic gastritis       Past Surgical History:   Procedure Laterality Date    NO HISTORY OF SURGERY        Allergies   Allergen Reactions    Blood Transfusion Related (Informational Only) Other (See Comments)     Patient has a history of a clinically significant antibody against RBC antigens. A delay in compatible RBCs may occur. UID identified by [employer] on 10/30/2017      Social History     Tobacco Use    Smoking status: Never     Passive exposure: Yes    Smokeless tobacco: Never    Tobacco comments:     mom smokes    Substance Use Topics    Alcohol use: Not Currently     Alcohol/week: 0.8 standard drinks of alcohol     Types: 1 Shots of liquor per week      Wt Readings from Last 1 Encounters:   23 70.3 kg (155 lb)              OUTSIDE LABS:  CBC:   Lab Results   Component Value Date    WBC 10.9 2024    WBC 14.0 (H) 2023    HGB 11.5 (L) 2024    HGB 12.1 2023    HCT 34.9 (L) 2024    HCT 35.7 2023     2024     2023     BMP:   Lab Results   Component Value Date     (L) 2023     (H) 2021    POTASSIUM 3.8 2023    POTASSIUM 3.6 2021    CHLORIDE 100 2023    CHLORIDE 121 (H) 2021    CO2 22 2023    CO2 24 2021    BUN 6.9 2023    BUN 8 2021    CR 0.58 2023    CR 0.90 2021    GLC 80 2023     (H) 2021     COAGS: No results found for: \"PTT\", \"INR\", \"FIBR\"  POC:   Lab Results   Component Value Date    HCG Negative 2013    HCGS Negative 2021     HEPATIC:   Lab Results   Component Value Date    ALBUMIN 3.5 2023    PROTTOTAL 7.2 2023    ALT 7 2023    AST 12 2023    ALKPHOS 80 2023    BILITOTAL 0.3 2023 "     OTHER:   Lab Results   Component Value Date    KECIA 9.2 12/19/2023    LIPASE 92 12/10/2008    TSH 2.69 07/18/2021       Anesthesia Plan    ASA Status:  2       Anesthesia Type: Epidural.              Consents    Anesthesia Plan(s) and associated risks, benefits, and realistic alternatives discussed. Questions answered and patient/representative(s) expressed understanding.     - Discussed:     - Discussed with:  Patient            Postoperative Care            Comments:               Idalia Torres MD    I have reviewed the pertinent notes and labs in the chart from the past 30 days and (re)examined the patient.  Any updates or changes from those notes are reflected in this note.

## 2024-03-13 NOTE — PLAN OF CARE
of viable Male with Samara Yeager and Meena Loya, LASHELL in attendance. Infant with spontaneous cry to mothers abdomen, dried and stimulated. Apgars 8 and 9. Placenta delivered without complications, pitocin bolused, no laceration, just a tear at the right lateral vaginal area without repair. Large blood clot noted, CNM notified and came assess pt, more blood clot passed upon fundal massage; Miso rectal was given per CNM. Marisol cares provided. Mother and baby in stable condition.

## 2024-03-13 NOTE — PLAN OF CARE
Patient arrived to [facility] unit via wheelchair at 2240 ,with belongings, accompanied by spouse/ significant other, with infant in arms. Received report from  Cara BENTLEY RN  and checked bands. Unit and room orientation  completed . Call light given and within arms reach; yes concerns present at this time- awaiting report from resource RN regarding Peds provider notification and further treatment plan for baby blood glucose checks. Otherwise mother and baby appear stable at this time. Continue with plan of care.

## 2024-03-13 NOTE — PLAN OF CARE
Goal Outcome Evaluation:      Plan of Care Reviewed With: patient    Overall Patient Progress: improving    Problem: Postpartum (Vaginal Delivery)  Goal: Optimal Pain Control and Function  Outcome: Progressing  Intervention: Prevent or Manage Pain  Recent Flowsheet Documentation  Taken 3/13/2024 0825 by Valerie Pascual, RN  Pain Management Interventions: medication (see MAR)  Taken 3/13/2024 0730 by Valerie Pascual, RN  Pain Management Interventions:   medication (see MAR)   heat applied  VSS, BP WDL. Pain managed with as needed medications and heat. Positive bonding observed with . Independent with  cares.

## 2024-03-13 NOTE — PROVIDER NOTIFICATION
03/12/24 1913   Provider Notification   Provider Name/Title MARGARITO Loya CNM   Method of Notification At Bedside   Request Evaluate in Person   Notification Reason Decels;SVE     Patient having repetitive variables after epidural placement, at bedside for SVE.

## 2024-03-13 NOTE — L&D DELIVERY NOTE
Delivery Summary    Anjelica Ocampo MRN# [id number]   Age: 28 year old YOB: 1995     Delivery Note  Labor Course:   Anjelica Ocampo presented to the birthplace for an elective induction of labor on 3/12/2024 at 0932. SVE on admission was 3/ 50%/ Mid/ average/ -1 with a gilbert score of 6. She was agreeable to induction with pitocin which was started at 1113. Penicillin was started for GBS prophylaxis and was adequately treated. Amniotomy was done at 1540 for clear fluid after patient's consent and her cervix remained unchanged. She used nitrous for pain management and then requested for a cervical check at 1815 which was 3.5/80%/-1/mid/soft. She requested an epidural for pain management that was placed. She progressed well and was 8/80%/-1 at 1913 and with the next contraction had an anterior lip. She became complete at 1917.    Delivery Course:  Patient became complete at 1917 and started pushing at 1920. Pushed over 14 minutes and brought the head to a crown, delivered MOA, restituted ROT and anterior shoulder delivered without difficulty with maternal effort. Delivered a vigorous live male  at 1934 who was immediately placed on mom's abdomen. No nuchal cord. IV pitocin started after delivery of infant per protocol. Umbilical cord was double clamped and cut by FOB after the cord stopped pulsing. Cord segment cut per protocol. Cord blood obtained. Placenta spontaneously delivered intact at 1942 without difficulty via Schultze mechanism. Inspection of vagina and perineum revealed a minor right lateral vaginal abrasion not requiring repair. Fundus is firm and midline. Mom and baby are stable. FHT's with terminal bradycardia for the last 6 minutes prior to birth.  Cord gas segment not sent due to apgar scores.     IUP at 40+2 weeks gestation delivered on 2024.     delivery of a viable Male infant.  Weight : 6lbs 10oz, 3020gms  Apgars of 8 at 1 minute and 9 at 5  minutes.  Labor was induced with pitocin and augmented with with amniotomy.  Medications administered  in labor:  Pain Rx Epidural and nitrous; Antibiotics Yes: PCN; Other none  Perineum: Minor laceration - No repair  Placenta-mechanism: spontaneous, intact,  with a 3 vessel cord. IV oxytocin was given.  QBL was 200 ml.  Anticipated Discharge Date: 3/14/2024  Complications of pregnancy, labor and delivery: Terminal bradycardia with second stage with good descent and effective pushing  Birth attendants:IKE Saucedo CNM ;Cholo Smiley [[id number]]      Labor Event Times      Active labor onset date: 3/12/24 Onset time:  7:20 PM   Dilation complete date: 3/12/24 Complete time:  7:15 PM   Start pushing date/time: 3/12/2024 1920          Labor Events     labor?: No   steroids: None  Labor Type: Induction/Cervical ripening, AROM  Predominate monitoring during 1st stage: continuous electronic fetal monitoring     Antibiotics received during labor?: Yes  Reason for Antibiotics: GBS  Antibiotics received for GBS: Penicillin  Antibiotics Given (GBS): Greater than 4 hours prior to delivery     Rupture identifier: Sac 1  Rupture date/time: 3/12/24 1540   Rupture type: Artificial Rupture of Membranes  Fluid color: Clear  Fluid odor: Normal     Induction: Oxytocin  Induction date/time:      Cervical ripening date/time: 3/12/24 1110    Indications for induction: Elective     Augmentation: AROM  Indications for augmentation: Ineffective Contraction Pattern       Delivery/Placenta Date and Time      Delivery Date: 3/12/24 Delivery Time:  7:34 PM   Placenta Date/Time: 3/12/2024  7:42 PM  Oxytocin given at the time of delivery: after delivery of baby              Vaginal Counts       Initial count performed by 2 team members:  Two Team Members   Samara cK         Needles Suture Needles Sponges (RETIRED) Instruments   Initial counts 2  5    Added to  count       Relief counts       Final counts 2  5            Placed during labor Accounted for at the end of labor   FSE No NA   IUPC No NA   Cervidil No NA                  Final count performed by 2 team members:  Two Team Members   Samara Peralta      Final count correct?: Yes  Pre-Birth Team Brief: Complete  Post-Birth Team Debrief: Complete       Apgars    Living status: Living   1 Minute 5 Minute 10 Minute 15 Minute 20 Minute   Skin color: 1  1       Heart rate: 2  2       Reflex irritability: 2  2       Muscle tone: 1  2       Respiratory effort: 2  2       Total: 8  9       Apgars assigned by: ANANYA CARDENAS RN       Cord      Vessels: 3 Vessels    Cord Complications: None               Cord Blood Disposition: Lab    Gases Sent?: No    Delayed cord clamping?: Yes    Cord Clamping Delay (seconds): >120 seconds    Stem cell collection?: No            Resuscitation    Methods: None   Care at Delivery: Infant brought skin to skin and stimulated with a vigorous cry.        Skin to Skin and Feeding Plan      Skin to skin initiation date/time: 1/3/1841    Skin to skin with: Mother  Skin to skin end date/time:            Labor Events and Shoulder Dystocia    Fetal Tracing Prior to Delivery: Category 2  Fetal Tracing Comments: moderate variabilitty with recurrent variables and terminal bradycardia with second stage  Shoulder dystocia present?: Neg       Delivery (Maternal) (Provider to Complete) ([id number])    Episiotomy: None  Perineal lacerations: None    Repair suture: None  Genital tract inspection done: Pos       Blood Loss  Mother: Anjelica Ocampo #[id number]     Start of Mother's Information      Delivery Blood Loss  24 1920 - 24 2016      None                 End of Mother's Information  Mother: Anjelica Ocampo MARGARITO #[id number]                Delivery - Provider to Complete ([id number])    Delivery Type (Choose the 1 that will go to the Birth History): Vaginal,  "Spontaneous                                           Placenta    Date/Time: 3/12/2024  7:42 PM  Removal: Spontaneous  Disposition: Patient possesion             Presentation and Position    Presentation: Vertex    Position: Middle Occiput Anterior                     I, Samara RENE, am serving as a scribe to document services personally performed by Meena Loya CNM based on the provider's statements to me.\" Samara RENE    I agree with the PFSH and ROS as completed by Samara RENE except for changes made by me. The remainder of the encounter was performed by me and scribed by Samara RENE. The scribed note accurately reflects my personal services and decisions made by me.   Meena Loya, IKE LOBO      "

## 2024-03-13 NOTE — PROGRESS NOTES
Anesthesia Post-Partum Follow-Up Note After Vaginal Delivery with Epidural    Patient: Anjelica Ocampo    Patient location: Post-partum floor    Anesthesia type: Epidural    Subjective  Anjelica Ocampo does not complain of pruritis at this time. She denies weakness, denies paresthesia, denies difficulties breathing or voiding, denies nausea or vomiting, and denies headache. She is able to ambulate and tolerates regular diet. Patient endorsed a positive anesthesia experience.    Objective  Respiratory Function (RR / SpO2 / Airway Patency): Satisfactory  Cardiac Function (HR / Rhythm / BP): Satisfactory  Strength and sensation lower extremities: Normal  Site of epidural insertion: No signs of infection or inflammation    Most recent vitals  /68 (BP Location: Left arm, Patient Position: Semi-Barone's, Cuff Size: Adult Regular)   Pulse 71   Temp 36.5  C (97.7  F) (Oral)   Resp 16   LMP 2023   SpO2 99%   Breastfeeding Unknown     Assessment and plan  Anjelica Ocampo is a 28 year old female  post-partum #1 s/p vaginal delivery. An epidural catheter was successfully inserted and provided labor analgesia via PID pump infusing ropivacaine and fentanyl. The patient delivered via  and the epidural catheter was removed immediately thereafter by the L&D RN.     At this time, there is no evidence of adverse side effects associated with the insertion or removal of the epidural catheter. If the patient develops new lower extremity paresis or paresthesias, or if there are concerns regarding the insertion site of the catheter, please reach out to the anesthesia department OB division [phone]    Thank you for including us in the care for this patient.    MICKIE PHAN MD  Anesthesiology CA1

## 2024-03-13 NOTE — CONSULTS
Social Work Initial Consult        General Information  Assessment completed with: Patient, Lambert   Type of visit: Initial Assessment      Reason for Consult: Chemical Health Concerns    During Assessment Lambert shared this is her 3rd child and she named him Dave Chicas.     Communication Assessment: Spoken language-English     Living Environment:   Lambert reports that she resides in [facility] in [place] and lives with her two children who are 7 and 6 years old as well as her significant other Dave.      Family/Support Factors  Lambert describes a robust support system and has many family and friends around to give her a break when needed Lambert reports her 7 and 6 year old are currently being cared for by their dad who also lives in [facility] and they have a good co-parenting relationship and support one another. Lambert's support system is active and involved.     Community Resources:   Current Resources:  Therese reports that she has needed baby supplies and safe sleep space for her baby. Lambert reports her sister threw her a gender reveal party and they were able to obtain many baby items through that. Lambert reports needing no additional resources at this time.     Employment/Financial/Insurance  Employment Status: Both lambert and dave are currently unemployed.   Financial concerns:  Lambert reports she is not currently working and neither is dave, but she utilizes food support and housing through the [facility]. Lambert reports she also is enrolled in WIC, but needs to give them a call to set up an appointment to get benefits started.   Insurance: [facility]        Mental Health Status:  Current Status: MICHELE facilitated conversation around mental health with Lambert. Lambert reports she never experienced any post partum depression or anxiety with her two previous children. Lambert reports that she does not have any concerns for PPD or Anxiety. MICHELE discussed  symptoms and how they can present themselves post partum. SW informed Lambert that if any concerns arise she could reach out to her PCP and they could get her connected to resources if needed. No further resources are needed at this time.     Chemical Health:   Current Status: SW discussed substance use with Lambert. Lambert reports that she used Marijuana during her pregnancy to help cope with the nausea. Lambert reports that is the only thing that helped her nausea at the time. Lambert denied any additional substance use. Lambert reports since she used marijuana throughout her pregnancy she does believe the cord tissue would come back positive for Marijuana.     Additional Information:  MICHELE discussed with Lambert the hospital policy on reporting substance use (including marijuana) to child protection. Lambert reports she did not think a report would be made and did not think about it. Lambert reports she has no previous history with the child protection system. SW stated that cord tissue is pending and once received a report will be completed. SW discussed how child protection approaches Marijuana use and wanted to give lambert a heads up about hospital policy pending discharge tomorrow. Lambert had no further questions.      INTERVENTION  Conducted chart review.  Introduced SW role and scope of practice.   Completed Psychosocial assessment.   Discussed primary concern of positive tox screen.  Provided SW contact info    PLAN    SW will continue to follow for supportive intervention and complete CPS report if needed pending cord tissue results.     REHAN Marte, [facility]  Maternal and Child Health   Office: [phone]  After Hours Pager: [phone]  [email or web address]

## 2024-03-13 NOTE — PROVIDER NOTIFICATION
03/12/24 1835   Provider Notification   Provider Name/Title MARGARITO junior CNM   Method of Notification Electronic Page   Request Evaluate in Person   Notification Reason Decels;Other (Comment)     Patient feeling rectal pressure with variable decels. Patient would still like to proceed with epidural placement. Shalini LOBO verified and is okay for epidural placement to proceed.

## 2024-03-13 NOTE — PLAN OF CARE
Data: Anjelica Ocampo transferred to [facility] via wheelchair at 2237. Baby transferred via parent's arms.  Action: Receiving unit notified of transfer: Yes. Patient and family notified of room change. Report given to VIN Zazueta at 2237. Belongings sent to receiving unit. Accompanied by Registered Nurse and FOB. Call light within reach. ID bands double-checked with VIN Miller.  Response: Patient tolerated transfer and is stable.

## 2024-03-13 NOTE — PROGRESS NOTES
RN Notification:  Paged by RN to notify provider of vaginal bleeding.  RN reported large blood clot with fundal massage and large gush of bleeding.  QBL at that time was 470cc.  CNM and SNM in room, instructed RN to empty patient's bladder.  Consent obtained for internal exam.  Clots expressed from cervix, fundus is firm with resolved bleeding after massage.  Rectal Misoprostol given.  QBL now 660cc.    IKE Valentin CNM

## 2024-03-13 NOTE — PLAN OF CARE
Patient received epidural at 1850. Patient tolerated placement well. VSS. Patient having variable decels, MARGARITO Loya CNM notified. CNM at bedside at 1907 to assess. Report given to Radha BANUELOS at 1910. Care transferred.

## 2024-03-14 VITALS
HEART RATE: 77 BPM | WEIGHT: 155.65 LBS | RESPIRATION RATE: 16 BRPM | SYSTOLIC BLOOD PRESSURE: 132 MMHG | OXYGEN SATURATION: 99 % | BODY MASS INDEX: 27.57 KG/M2 | TEMPERATURE: 97.5 F | DIASTOLIC BLOOD PRESSURE: 89 MMHG

## 2024-03-14 PROCEDURE — 250N000013 HC RX MED GY IP 250 OP 250 PS 637: Performed by: ADVANCED PRACTICE MIDWIFE

## 2024-03-14 PROCEDURE — 99238 HOSP IP/OBS DSCHRG MGMT 30/<: CPT | Performed by: ADVANCED PRACTICE MIDWIFE

## 2024-03-14 RX ORDER — IBUPROFEN 600 MG/1
600 TABLET, FILM COATED ORAL EVERY 6 HOURS PRN
Qty: 60 TABLET | Refills: 0 | Status: SHIPPED | OUTPATIENT
Start: 2024-03-14 | End: 2024-03-14

## 2024-03-14 RX ORDER — ACETAMINOPHEN 325 MG/1
650 TABLET ORAL EVERY 6 HOURS PRN
Qty: 100 TABLET | Refills: 0 | Status: SHIPPED | OUTPATIENT
Start: 2024-03-14

## 2024-03-14 RX ORDER — MULTIVIT WITH MINERALS/LUTEIN
250 TABLET ORAL DAILY
Qty: 60 TABLET | Refills: 0 | Status: SHIPPED | OUTPATIENT
Start: 2024-03-14

## 2024-03-14 RX ORDER — LANOLIN ALCOHOL/MO/W.PET/CERES
1000 CREAM (GRAM) TOPICAL DAILY
Qty: 60 TABLET | Refills: 0 | Status: SHIPPED | OUTPATIENT
Start: 2024-03-14

## 2024-03-14 RX ORDER — FERROUS SULFATE 325(65) MG
325 TABLET ORAL
Qty: 60 TABLET | Refills: 0 | Status: SHIPPED | OUTPATIENT
Start: 2024-03-14

## 2024-03-14 RX ORDER — IBUPROFEN 100 MG/5ML
10 SUSPENSION, ORAL (FINAL DOSE FORM) ORAL EVERY 6 HOURS PRN
Qty: 237 ML | Refills: 0 | Status: SHIPPED | OUTPATIENT
Start: 2024-03-14

## 2024-03-14 RX ORDER — AMOXICILLIN 250 MG
1 CAPSULE ORAL DAILY
Qty: 100 TABLET | Refills: 0 | Status: SHIPPED | OUTPATIENT
Start: 2024-03-14

## 2024-03-14 RX ORDER — NIFEDIPINE 30 MG
30 TABLET, EXTENDED RELEASE ORAL DAILY
Qty: 60 TABLET | Refills: 1 | Status: SHIPPED | OUTPATIENT
Start: 2024-03-14

## 2024-03-14 RX ADMIN — IBUPROFEN 800 MG: 200 SUSPENSION ORAL at 07:54

## 2024-03-14 RX ADMIN — ACETAMINOPHEN 650 MG: 325 TABLET, FILM COATED ORAL at 07:54

## 2024-03-14 RX ADMIN — NIFEDIPINE 30 MG: 30 TABLET, FILM COATED, EXTENDED RELEASE ORAL at 03:57

## 2024-03-14 ASSESSMENT — ACTIVITIES OF DAILY LIVING (ADL)
ADLS_ACUITY_SCORE: 18

## 2024-03-14 NOTE — DISCHARGE SUMMARY
[facility] Discharge Summary    Anjelica Ocampo MRN# [id number]   Age: 28 year old YOB: 1995     Date of Admission:  3/12/2024  Date of Discharge::  3/14/2024  Admitting Physician:  IKE Valentin CNMARCELLO  Discharge Physician:  Sage Gu      Home clinic: [facility]          Admission Diagnoses:   Intrauterine pregnancy at 40 weeks gestation         Discharge Diagnosis:     Normal spontaneous vaginal delivery  Postpartum hypertension   Anemia due to acute blood loss           Procedures:     Procedure(s): No additional procedures performed       No procedures performed during this admission           Medications Prior to Admission:     Medications Prior to Admission   Medication Sig Dispense Refill Last Dose    acetaminophen (TYLENOL) 500 MG tablet Take 1-2 tablets (500-1,000 mg) by mouth every 6 hours as needed for mild pain or pain (heaaches) 30 tablet 0 Past Week    [DISCONTINUED] diphenhydrAMINE (BENADRYL) 25 MG capsule Take 1 capsule (25 mg) by mouth every 6 hours as needed for other (headache) 30 capsule 0 Unknown    [DISCONTINUED] metoclopramide (REGLAN) 5 MG tablet Take 1 tablet (5 mg) by mouth 3 times daily as needed (up to TID prn for HA) 15 tablet 0 Unknown    [DISCONTINUED] ondansetron (ZOFRAN) 4 MG tablet Take 4 mg by mouth every 8 hours as needed for nausea   Past Week             Discharge Medications:     Current Discharge Medication List        START taking these medications    Details   !! acetaminophen (TYLENOL) 325 MG tablet Take 2 tablets (650 mg) by mouth every 6 hours as needed for mild pain Start after Delivery.  Qty: 100 tablet, Refills: 0    Associated Diagnoses:  (normal spontaneous vaginal delivery)      cyanocobalamin (VITAMIN B-12) 1000 MCG tablet Take 1 tablet (1,000 mcg) by mouth daily  Qty: 60 tablet, Refills: 0    Associated Diagnoses:  (normal spontaneous vaginal delivery)      ferrous sulfate (FEROSUL) 325 (65 Fe) MG tablet  Take 1 tablet (325 mg) by mouth daily (with breakfast)  Qty: 60 tablet, Refills: 0    Associated Diagnoses:  (normal spontaneous vaginal delivery)      ibuprofen (ADVIL/MOTRIN) 100 MG/5ML suspension Take 35 mLs (700 mg) by mouth every 6 hours as needed for fever or moderate pain  Qty: 237 mL, Refills: 0    Associated Diagnoses:  (normal spontaneous vaginal delivery)      NIFEdipine ER (ADALAT CC) 30 MG 24 hr tablet Take 1 tablet (30 mg) by mouth daily  Qty: 60 tablet, Refills: 1    Associated Diagnoses:  (normal spontaneous vaginal delivery); Postpartum hypertension      senna-docusate (SENOKOT-S/PERICOLACE) 8.6-50 MG tablet Take 1 tablet by mouth daily Start after delivery.  Qty: 100 tablet, Refills: 0    Associated Diagnoses:  (normal spontaneous vaginal delivery)      vitamin C (ASCORBIC ACID) 250 MG tablet Take 1 tablet (250 mg) by mouth daily  Qty: 60 tablet, Refills: 0    Associated Diagnoses:  (normal spontaneous vaginal delivery)       !! - Potential duplicate medications found. Please discuss with provider.        CONTINUE these medications which have NOT CHANGED    Details   !! acetaminophen (TYLENOL) 500 MG tablet Take 1-2 tablets (500-1,000 mg) by mouth every 6 hours as needed for mild pain or pain (heaaches)  Qty: 30 tablet, Refills: 0    Associated Diagnoses: Intractable headache, unspecified chronicity pattern, unspecified headache type       !! - Potential duplicate medications found. Please discuss with provider.        STOP taking these medications       diphenhydrAMINE (BENADRYL) 25 MG capsule Comments:   Reason for Stopping:         metoclopramide (REGLAN) 5 MG tablet Comments:   Reason for Stopping:         ondansetron (ZOFRAN) 4 MG tablet Comments:   Reason for Stopping:                     Consultations:   Consultation during this admission received from [facility] d/t +tox screen             Brief History of Labor:   Labor Course:   Anjelica Ocampo presented to the  birthplace for an elective induction of labor on 3/12/2024 at 0932. SVE on admission was 3/ 50%/ Mid/ average/ -1 with a gilbert score of 6. She was agreeable to induction with pitocin which was started at 1113. Penicillin was started for GBS prophylaxis and was adequately treated. Amniotomy was done at 1540 for clear fluid after patient's consent and her cervix remained unchanged. She used nitrous for pain management and then requested for a cervical check at 1815 which was 3.5/80%/-1/mid/soft. She requested an epidural for pain management that was placed. She progressed well and was 8/80%/-1 at 1913 and with the next contraction had an anterior lip. She became complete at 1917.     Delivery Course:  Patient became complete at 1917 and started pushing at 1920. Pushed over 14 minutes and brought the head to a crown, delivered MOA, restituted ROT and anterior shoulder delivered without difficulty with maternal effort. Delivered a vigorous live male  at 1934 who was immediately placed on mom's abdomen. No nuchal cord. IV pitocin started after delivery of infant per protocol. Umbilical cord was double clamped and cut by FOB after the cord stopped pulsing. Cord segment cut per protocol. Cord blood obtained. Placenta spontaneously delivered intact at 1942 without difficulty via Schultze mechanism. Inspection of vagina and perineum revealed a minor right lateral vaginal abrasion not requiring repair. Fundus is firm and midline. Mom and baby are stable. FHT's with terminal bradycardia for the last 6 minutes prior to birth.  Cord gas segment not sent due to apgar scores.     IUP at 40+2 weeks gestation delivered on 2024.     delivery of a viable Male infant.  Weight : 6lbs 10oz, 3020gms  Apgars of 8 at 1 minute and 9 at 5 minutes.  Labor was induced with pitocin and augmented with with amniotomy.  Medications administered  in labor:  Pain Rx Epidural and nitrous; Antibiotics Yes: PCN; Other  "none  Perineum: Minor laceration - No repair  Placenta-mechanism: spontaneous, intact,  with a 3 vessel cord. IV oxytocin was given.  QBL was 200 ml.  Anticipated Discharge Date: 3/14/2024  Complications of pregnancy, labor and delivery: Terminal bradycardia with second stage with good descent and effective pushing  Birth attendants:IKE Saucedo,LASHELL ;Samara RENE     Assessment Day of Discharge    Vital signs:  Temp: 97.5  F (36.4  C) Temp src: Oral BP: 132/89 Pulse: 77   Resp: 16   O2 Device: None (Room air)     Weight: 70.6 kg (155 lb 10.3 oz)  Estimated body mass index is 27.57 kg/m  as calculated from the following:    Height as of 12/19/23: 1.6 m (5' 3\").    Weight as of this encounter: 70.6 kg (155 lb 10.3 oz).      Breasts: Soft, filling  Nipples: Intact, Non-tender  Abdomen: Soft, Non-tender    Uterus: Fundus Firm, Non-tender, located U/1  Lochia: Rubra, appropriate amount    Perineum:  Well-approximated, healing well  Lower Extremities: Trace Edema Bilateral           Hospital Course:   The patient's hospital course was unremarkable.  On discharge, pain was well controlled. Vaginal bleeding is similar to peak menstrual flow.  Voiding without difficulty.  Ambulating well and tolerating a normal diet.  No fever. Patient has been formula feeding due to infants low blood sugars, is not interested in breastfeeding at this time.  Infant was transferred to NICU due to low blood sugars.  Has had a bowel movement. Mood stable, has family supports identified.   Anjelica Ocampo was discharged on post-partum day #2.    Post-partum hemoglobin:   Hemoglobin   Date Value Ref Range Status   03/13/2024 10.4 (L) 11.7 - 15.7 g/dL Final   12/26/2017 10.7 (A) 11.7 - 15.7 g/dL Final     Reviewed hemoglobin of 10.4, anemia due to acute blood loss. Recommended iron supplementation. Prescription sent for po iron, vitamin c and b12. Reviewed use.  Also recommended increasing iron rich foods.      Rh:O+, Rhogam " given: NA  Rubella status: immune  Plan for contraception: Condoms    Met PP hypertension criteria, last elevated BP- mild range >24 hours.   Started on nifedipine 3/14 @ 0325.  30mg nifedipine ordered for discharge.   Home BP cuff ordered, BP values and s/s reviewed. Does not qualify for [place] bp program.  Will have [facility] clinic follow up early next week.    Reviewed Chapter One of  [facility] including warning signs of postpartum, activity level, avoiding IC for 6 weeks, Tub soaks BID, Kegels, abdominal exercises, breast care,  postpartum depression/anxiety.  Reviewed how to establish/maintain milk supply, nipple care, pumping for storage, fore/hind milk, wet/dirty diapers to expect each day, resources prn if questions or concerns.  Pt verbalized understanding with teach back.          Discharge Instructions and Follow-Up:     Discharge diet: Regular, Iron Rich, High Fiber, Minimum 80oz water daily   Discharge activity: Pelvic rest: abstain from intercourse and do not use tampons for 6 week(s)   Discharge follow-up: Follow up with [facility] in 3-5 days for BP check   Perineal care   Peribottle, sitz bath, ice pack, witch hazel prn           Discharge Disposition:     Discharged to home        Sage TORREZ, ritika serving as a scribe; to document services personally performed by Dewey Lambert CNM based on data collection and the provider's statements to me.     ANJU Brunner         The encounter was performed by me and scribed by the SNM. The scribed note accurately reflects my personal services and decisions made by me.     IKE David CNM

## 2024-03-14 NOTE — PLAN OF CARE
VSS and postpartum assessments WDL.  Up ad aram with steady gait and independent with cares.  Went down to visit infant in NICU and speaking positively about infant's progress.  Not pumping and declined lactation visit.  Pain managed with tylenol and ibuprofen per MAR.  Provided education, instruction and demonstration on home blood pressure cuff usage and monitoring.  Reviewed discharge medications.  Reviewed follow-up for appointments for home care BP check in 3-5 days, clinic visit in 2 weeks and 6 weeks.  Reviewed discharge instructions and answered all questions.  Discharged home with all belongings at 1200.

## 2024-03-14 NOTE — DISCHARGE INSTRUCTIONS
Warning Signs after Having a Baby    Keep this paper on your fridge or somewhere else where you can see it.    Call your provider if you have any of these symptoms up to 12 weeks after having your baby.    Thoughts of hurting yourself or your baby  Pain in your chest or trouble breathing  Severe headache not helped by pain medicine  Eyesight concerns (blurry vision, seeing spots or flashes of light, other changes to eyesight)  Fainting, shaking or other signs of a seizure    Call 9-1-1 if you feel that it is an emergency.     The symptoms below can happen to anyone after giving birth. They can be very serious. Call your provider if you have any of these warning signs.    My provider s phone number: _______________________    Losing too much blood (hemorrhage)    Call your provider if you soak through a pad in less than an hour or pass blood clots bigger than a golf ball. These may be signs that you are bleeding too much.    Blood clots in the legs or lungs    After you give birth, your body naturally clots its blood to help prevent blood loss. Sometimes this increased clotting can happen in other areas of the body, like the legs or lungs. This can block your blood flow and be very dangerous.     Call your provider if you:  Have a red, swollen spot on the back of your leg that is warm or painful when you touch it.   Are coughing up blood.     Infection    Call your provider if you have any of these symptoms:  Fever of 100.4 F (38 C) or higher.  Pain or redness around your stitches if you had an incision.   Any yellow, white, or green fluid coming from places where you had stitches or surgery.    Mood Problems (postpartum depression)    Many people feel sad or have mood changes after having a baby. But for some people, these mood swings are worse.     Call your provider right away if you feel so anxious or nervous that you can't care for yourself or your baby.    Preeclampsia (high blood pressure)    Even if you  "didn't have high blood pressure when you were pregnant, you are at risk for the high blood pressure disease called preeclampsia. This risk can last up to 12 weeks after giving birth.     Call your provider if you have:   Pain on your right side under your rib cage  Sudden swelling in the hands and face    Remember: You know your body. If something doesn't feel right, get medical help.     For informational purposes only. Not to replace the advice of your health care provider. Copyright 2020 [employer]. [employer] [facility]. All rights reserved. Clinically reviewed by Ebonie Ho, RNC-OB, MSN. [employer] 462055 - Rev .    Postpartum Care at Home With Your Baby: Care Instructions  Overview     After childbirth (postpartum period), your body goes through many changes as you recover. In these weeks after delivery, try to take good care of yourself. Get rest whenever you can and accept help from others.  It may take 4 to 6 weeks to feel like yourself again, and possibly longer if you had a  birth. You may feel sore or very tired as you recover. After delivery, you may continue to have contractions as the uterus returns to the size it was before your pregnancy. You will also have some vaginal bleeding. And you may have pain around the vagina as you heal. Several days after delivery you may also have pain and swelling in your breasts as they fill with milk. There are things you can do at home to help ease these discomforts.  After childbirth, it's common to feel emotional. You may feel irritable, cry easily, and feel happy one minute and sad the next. This is called the \"baby blues.\" Hormone changes are one cause of these emotional changes. These feelings usually get better within a couple of weeks. If they don't, talk to your doctor or midwife.  In the first couple of weeks after you give birth, your doctor or midwife may want to check in with you and make a plan for follow-up care. You will likely have a " complete postpartum visit in the first 3 months after delivery. At that time, your doctor or midwife will check on your recovery and see how you're doing. But if you have questions or concerns before then, you can always call your doctor or midwife.  Follow-up care is a key part of your treatment and safety. Be sure to make and go to all appointments, and call your doctor if you are having problems. It's also a good idea to know your test results and keep a list of the medicines you take.  How can you care for yourself at home?  Taking care of your body  Use pads instead of tampons for bleeding. After birth, you will have bloody vaginal discharge. You may also pass some blood clots that shouldn't be bigger than an egg. Over the next 6 weeks or so, your bleeding should decrease a little every day and slowly change to a pinkish and then whitish discharge.  For cramps or mild pain, try an over-the-counter pain medicine, such as acetaminophen (Tylenol) or ibuprofen (Advil, Motrin). Read and follow all instructions on the label.  To ease pain around the vagina or from hemorrhoids:  Put ice or a cold pack on the area for 10 to 20 minutes at a time. Put a thin cloth between the ice and your skin.  Try sitting in a few inches of warm water (sitz bath) when you can or after bowel movements.  Clean yourself with a gentle squeeze of warm water from a bottle instead of wiping with toilet paper.  Use witch hazel or hemorrhoid pads (such as Tucks).  Try using a cold compress for sore and swollen breasts. And wear a supportive bra that fits.  Ease constipation by drinking plenty of fluids and eating high-fiber foods. Ask your doctor or midwife about over-the-counter stool softeners.  Activity  Rest when you can.  Ask for help from family or friends when you need it.  If you can, have another adult in your home for at least 2 or 3 days after birth.  When you feel ready, try to get some exercise every day. For many people, walking  is a good choice. Don't do any heavy exercise until your doctor or midwife says it's okay.  Ask your doctor or midwife when it is okay to have vaginal sex.  If you don't want to get pregnant, talk to your doctor or midwife about birth control options. You can get pregnant even before your period returns. Also, you can get pregnant while you are breastfeeding.  Talk to your doctor or midwife if you want to get pregnant again. They can talk to you about when it is safe.  Emotional health  It's normal to have some sadness, anxiety, and mood swings after delivery. It may help to talk with a trusted friend or family member. You can also call the Maternal Mental Health Hotline at 6-953-[facility] (1-419.283.7616) for support. If these mood changes last more than a couple of weeks, talk to your doctor or midwife.  When should you call for help?  Share this information with your partner, family, or a friend. They can help you watch for warning signs.  Call 911  anytime you think you may need emergency care. For example, call if:    You feel you cannot stop from hurting yourself, your baby, or someone else.     You passed out (lost consciousness).     You have chest pain, are short of breath, or cough up blood.     You have a seizure.   Where to get help 24 hours a day, 7 days a week   If you or someone you know talks about suicide, self-harm, a mental health crisis, a substance use crisis, or any other kind of emotional distress, get help right away. You can:    Call the Suicide and Crisis Lifeline at 028.     Call 2-159-322-TALK (1-702.712.4398).     Text HOME to 448475 to access the Crisis Text Line.   Consider saving these numbers in your phone.  Go to [employer] for more information or to chat online.  Call your doctor or midwife now or seek immediate medical care if:    You have signs of hemorrhage (too much bleeding), such as:  Heavy vaginal bleeding. This means that you are soaking through one or more pads in an  "hour. Or you pass blood clots bigger than an egg.  Feeling dizzy or lightheaded, or you feel like you may faint.  Feeling so tired or weak that you cannot do your usual activities.  A fast or irregular heartbeat.  New or worse belly pain.     You have signs of infection, such as:  A fever.  Increased pain, swelling, warmth, or redness from an incision or wound.  Frequent or painful urination or blood in your urine.  Vaginal discharge that smells bad.  New or worse belly pain.     You have symptoms of a blood clot in your leg (called a deep vein thrombosis), such as:  Pain in the calf, back of the knee, thigh, or groin.  Swelling in the leg or groin.  A color change on the leg or groin. The skin may be reddish or purplish, depending on your usual skin color.     You have signs of preeclampsia, such as:  Sudden swelling of your face, hands, or feet.  New vision problems (such as dimness, blurring, or seeing spots).  A severe headache.     You have signs of heart failure, such as:  New or increased shortness of breath.  New or worse swelling in your legs, ankles, or feet.  Sudden weight gain, such as more than 2 to 3 pounds in a day or 5 pounds in a week.  Feeling so tired or weak that you cannot do your usual activities.     You had spinal or epidural pain relief and have:  New or worse back pain.  Increased pain, swelling, warmth, or redness at the injection site.  Tingling, weakness, or numbness in your legs or groin.   Watch closely for changes in your health, and be sure to contact your doctor or midwife if:    Your vaginal bleeding isn't decreasing.     You feel sad, anxious, or hopeless for more than a few days.     You are having problems with your breasts or breastfeeding.   Where can you learn more?  Go to https://www.healthwise.net/patiented  Enter Z768 in the search box to learn more about \"Postpartum Care at Home With Your Baby: Care Instructions.\"  Current as of: July 10, 2023               Content " Version: 14.0    6113-7908 [employer].   Care instructions adapted under license by your healthcare professional. If you have questions about a medical condition or this instruction, always ask your healthcare professional. [employer] disclaims any warranty or liability for your use of this information.    Learning About Preeclampsia After Childbirth  What is preeclampsia?     Preeclampsia is high blood pressure and signs of organ damage, such as protein in the urine, usually after 20 weeks of pregnancy. If it's not treated, preeclampsia can harm you or your baby.  Severe preeclampsia can lead to dangerous seizures (eclampsia). When preeclampsia affects the liver, it can cause HELLP syndrome, a blood-clotting and bleeding problem. HELLP can come on quickly and can be dangerous. This is why your doctor checks you and your baby often.  Preeclampsia usually goes away after the baby is born. But symptoms may last or get worse after delivery. In rare cases, symptoms may not show up until days or even weeks after childbirth.  What are the symptoms?  Mild preeclampsia usually doesn't cause symptoms. But it may cause rapid weight gain and sudden swelling of the hands and face. Severe preeclampsia can cause symptoms such as a severe headache, vision problems, and trouble breathing. It also can cause belly pain. And you may urinate less than usual.  What can you expect after you've had preeclampsia?  In the hospital  After the baby and the placenta are delivered, preeclampsia usually starts to get better. Most people get better in the first few days after childbirth.  After having preeclampsia, you still have a risk of seizures for a day or more after childbirth. (In very rare cases, seizures happen later on.) So your doctor may have you take magnesium sulfate for a day or more to prevent seizures. You may also take medicine to lower your blood pressure.  When you go home  Your blood pressure will  most likely return to normal a few days after delivery. Your doctor will want to check your blood pressure sometime in the first week after you leave the hospital.  High blood pressure sometimes continues after childbirth. But it usually returns to normal levels with time.  Take and record your blood pressure at home if your doctor tells you to.  Ask your doctor to check your blood pressure monitor to be sure that it is accurate and that the cuff fits you. Also ask your doctor to watch you use it, to make sure that you are using it right.  Don't eat, use tobacco products, or use medicine known to raise blood pressure (such as some nasal decongestant sprays) before you take your blood pressure.  Avoid taking your blood pressure if you have just exercised or if you're nervous or upset. Rest at least 15 minutes before you take your blood pressure.  Take your medicines exactly as prescribed. Call your doctor if you think you are having a problem with your medicine.  If you smoke, try to quit. Talk to your doctor if you need help quitting.  Eat a variety of healthy foods. Include plenty of foods high in calcium, such as dairy products, almonds, and dark leafy greens.  Long-term health  After you've had preeclampsia, you have an increased risk of high blood pressure, heart disease, stroke, and kidney disease. This may be because the same things that cause preeclampsia also cause heart and kidney disease.  To protect your health, work with your doctor on living a heart-healthy lifestyle and getting the checkups you need. Your doctor may also want you to check your blood pressure at home.  Follow-up care is a key part of your treatment and safety. Be sure to make and go to all appointments, and call your doctor if you are having problems. It's also a good idea to know your test results and keep a list of the medicines you take.  When should you call for help?  Share this information with your partner or a friend. They can  "help you watch for warning signs.  Call 911  anytime you think you may need emergency care. For example, call if:    You passed out (lost consciousness).     You have a seizure.     You have trouble breathing.     You have chest pain.   Call your doctor now or seek immediate medical care if:    You have symptoms of preeclampsia, such as:  Sudden swelling of your face, hands, or feet.  New vision problems (such as dimness, blurring, or seeing spots).  A severe headache.     Your blood pressure is very high, such as 160/110 or higher.     Your blood pressure is higher than your doctor told you it should be, or it rises quickly.     You have new nausea or vomiting.     You have pain in your belly or pelvis.     You gain weight rapidly.   Where can you learn more?  Go to https://www.[employer].net/patiented  Enter Q718 in the search box to learn more about \"Learning About Preeclampsia After Childbirth.\"  Current as of: July 10, 2023               Content Version: 14.0    4092-6584 [employer].   Care instructions adapted under license by your healthcare professional. If you have questions about a medical condition or this instruction, always ask your healthcare professional. [employer] disclaims any warranty or liability for your use of this information.          "

## 2024-03-14 NOTE — PLAN OF CARE
Data: Vital signs within normal limits. Postpartum checks within normal limits - see flow record. Patient eating and drinking normally. Patient able to empty bladder independently and is up ambulating. No apparent signs of infection. Patient performing self cares.  Infant transferred to NICU, this shift.  Pt tearful @ times.  Action: Patient medicated during the shift for cramping. See MAR. Patient reassessed within 1 hour after each medication and pain was improved - patient stated she was comfortable. Patient education done about rest and analgesics. See flow record.  Response: Positive attachment behaviors observed with infant. Support persons are present.   Plan: Anticipate discharge on 3/14/24.Goal Outcome Evaluation:progressing.

## 2024-03-15 ENCOUNTER — MEDICAL CORRESPONDENCE (OUTPATIENT)
Dept: HEALTH INFORMATION MANAGEMENT | Facility: CLINIC | Age: 29
End: 2024-03-15
Payer: COMMERCIAL

## 2024-03-15 ENCOUNTER — MEDICAL CORRESPONDENCE (OUTPATIENT)
Dept: HEALTH INFORMATION MANAGEMENT | Facility: CLINIC | Age: 29
End: 2024-03-15

## 2024-03-15 ENCOUNTER — PATIENT OUTREACH (OUTPATIENT)
Dept: CARE COORDINATION | Facility: CLINIC | Age: 29
End: 2024-03-15
Payer: COMMERCIAL

## 2024-03-15 LAB
CANNABINOIDS UR CFM-MCNC: 610 NG/ML
CARBOXYTHC/CREAT UR: 177 NG/MG CREAT

## 2024-03-15 NOTE — PROGRESS NOTES
[facility]: [facility]: Post-Discharge Note  SITUATION                                                      Admission:    Admission Date: 03/12/24   Reason for Admission: Intrauterine pregnancy at 40 weeks gestation  Discharge:   Discharge Date: 03/14/24  Discharge Diagnosis: Normal spontaneous vaginal delivery  Postpartum hypertension   Anemia due to acute blood loss    BACKGROUND                                                      Per hospital discharge summary and inpatient provider notes:    Anjelica Ocampo is a 28 year old female with an Patient's last menstrual period was 06/04/2023. and Estimated Date of Delivery: Mar 10, 2024 is [facility]12/2024 at 9:31 AM with for induction of labor.  Indication: elective d/t hx of fast labors and +GBS and desiring full dose of PCN.       ASSESSMENT           Discharge Assessment  How are you doing now that you are home?: Patient states she is doing great. Vaginal bleeding is lighter than a normal period. Denies any thoughts of harming self or others. Blood pressure was a little elevated with home care nurse today. Denies any headaches, blurry vision or increased swelling of face or hands.  How are your symptoms? (Red Flag symptoms escalate to triage hotline per guidelines): Improved  Do you feel your condition is stable enough to be safe at home until your provider visit?: Yes  Does the patient have their discharge instructions? : Yes  Does the patient have questions regarding their discharge instructions? : No  Were you started on any new medications or were there changes to any of your previous medications? : Yes  Does the patient have all of their medications?: Yes  Do you have questions regarding any of your medications? : No  Do you have all of your needed medical supplies or equipment (DME)?  (i.e. oxygen tank, CPAP, cane, etc.): Yes  Discharge follow-up appointment scheduled within 14 calendar days? :  Yes  Discharge Follow Up Appointment Date: 03/18/24  Discharge Follow Up Appointment Scheduled with?: Primary Care Provider         Post-op (Clinicians Only)  Did the patient have surgery or a procedure: Yes  Drainage: No  Bleeding: light (Vaginal bleeding: Lighter than a normal period)  Fever: No  Chills: No  Redness: No  Warmth: No  Swelling: No  Incision site pain: No  Eating & Drinking: eating and drinking without complaints/concerns  PO Intake: regular diet  Additional Symptoms:  (Denies)  Bowel Function: normal (On the softer side of normal.)  Date of last BM: 03/15/24  Urinary Status: voiding without complaint/concerns        PLAN                                                      Outpatient Plan:  Follow up with [facility] in 3-5 days for BP check  Follow up with provider in 2 weeks and 6 weeks for post-delivery checks    No future appointments.      For any urgent concerns, please contact our 24 hour nurse triage line: [phone] ([phone])         Sheron Encinas RN

## 2025-06-15 ENCOUNTER — APPOINTMENT (OUTPATIENT)
Dept: GENERAL RADIOLOGY | Facility: CLINIC | Age: 30
End: 2025-06-15
Attending: EMERGENCY MEDICINE
Payer: COMMERCIAL

## 2025-06-15 ENCOUNTER — HOSPITAL ENCOUNTER (EMERGENCY)
Facility: CLINIC | Age: 30
Discharge: HOME | End: 2025-06-15
Attending: EMERGENCY MEDICINE | Admitting: EMERGENCY MEDICINE
Payer: COMMERCIAL

## 2025-06-15 VITALS
RESPIRATION RATE: 16 BRPM | SYSTOLIC BLOOD PRESSURE: 123 MMHG | TEMPERATURE: 97.9 F | OXYGEN SATURATION: 96 % | HEART RATE: 56 BPM | DIASTOLIC BLOOD PRESSURE: 65 MMHG

## 2025-06-15 DIAGNOSIS — S93.401A SPRAIN OF RIGHT ANKLE, UNSPECIFIED LIGAMENT, INITIAL ENCOUNTER: ICD-10-CM

## 2025-06-15 PROCEDURE — 99283 EMERGENCY DEPT VISIT LOW MDM: CPT | Performed by: EMERGENCY MEDICINE

## 2025-06-15 PROCEDURE — 73610 X-RAY EXAM OF ANKLE: CPT | Mod: RT

## 2025-06-15 PROCEDURE — 250N000013 HC RX MED GY IP 250 OP 250 PS 637: Performed by: EMERGENCY MEDICINE

## 2025-06-15 RX ORDER — IBUPROFEN 600 MG/1
600 TABLET, FILM COATED ORAL ONCE
Status: COMPLETED | OUTPATIENT
Start: 2025-06-15 | End: 2025-06-15

## 2025-06-15 RX ORDER — ACETAMINOPHEN 500 MG
1000 TABLET ORAL ONCE
Status: COMPLETED | OUTPATIENT
Start: 2025-06-15 | End: 2025-06-15

## 2025-06-15 RX ADMIN — ACETAMINOPHEN 1000 MG: 500 TABLET ORAL at 11:17

## 2025-06-15 RX ADMIN — IBUPROFEN 600 MG: 600 TABLET ORAL at 11:18

## 2025-06-15 ASSESSMENT — COLUMBIA-SUICIDE SEVERITY RATING SCALE - C-SSRS
2. HAVE YOU ACTUALLY HAD ANY THOUGHTS OF KILLING YOURSELF IN THE PAST MONTH?: NO
1. IN THE PAST MONTH, HAVE YOU WISHED YOU WERE DEAD OR WISHED YOU COULD GO TO SLEEP AND NOT WAKE UP?: NO
6. HAVE YOU EVER DONE ANYTHING, STARTED TO DO ANYTHING, OR PREPARED TO DO ANYTHING TO END YOUR LIFE?: NO

## 2025-06-15 ASSESSMENT — ACTIVITIES OF DAILY LIVING (ADL)
ADLS_ACUITY_SCORE: 42
ADLS_ACUITY_SCORE: 42

## 2025-06-15 NOTE — ED TRIAGE NOTES
Patient reports missing the bottom stair yesterday and rolling her right ankle. She reports hearing a loud sound like it broke. She then tried to rest and calm down and see if it got better. She reports that the pain is still severe today especially if she attempts to bear weight. There is swelling as well.      Triage Assessment (Adult)       Row Name 06/15/25 1055          Triage Assessment    Airway WDL WDL        Respiratory WDL    Respiratory WDL WDL        Skin Circulation/Temperature WDL    Skin Circulation/Temperature WDL WDL        Cardiac WDL    Cardiac WDL WDL        Peripheral/Neurovascular WDL    Peripheral Neurovascular WDL X;neurovascular assessment lower;pulse assessment     Pulse Assessment posterior tibial        Pulse Posterior Tibial    Right Posterior Tibial Pulse 2+ (normal)        Cognitive/Neuro/Behavioral WDL    Cognitive/Neuro/Behavioral WDL WDL        RLE Neurovascular Assessment    Temperature RLE warm     Color RLE no discoloration     Sensation RLE tenderness present;no tingling;no numbness

## 2025-06-15 NOTE — ED PROVIDER NOTES
ED Provider Note  [facility]      History     Chief Complaint   Patient presents with    Ankle Pain     HPI  Anjelica Ocampo is a 29 year old female who presents to the emergency department with ankle pain [place]  Denies other injuries.  Hurt her right ankle.  Difficulty walk due to pain.  No bleeding.  No other injuries.  Does not try anything for pain.      Physical Exam      Physical Exam  Physical Exam   Constitutional: oriented to person, place, and time. appears well-developed and well-nourished.   HENT:   Head: Normocephalic and atraumatic.   Neck: Normal range of motion.   Pulmonary/Chest: Effort normal. No respiratory distress.   Cardiac: No murmurs, rubs, gallops. RRR.  Abdominal: Abdomen soft, nontender, nondistended. No rebound tenderness.  MSK: Right ankle with some mild swelling laterally.  Tenderness over the lateral malleolus.  No significant deformity.  No laxity of the ankle.  Range of motion intact but with pain.  Achilles tendon intact clinically.  DP PT pulses normal in the right foot.  Sensation grossly tact.  Neurological: alert and oriented to person, place, and time.   Skin: Skin is warm and dry.   Psychiatric:  normal mood and affect.  behavior is normal. Thought content normal.       ED Course, Procedures, & Data      Procedures            Results for orders placed or performed during the hospital encounter of 06/15/25   Ankle XR, G/E 3 views, right     Status: None    Narrative    EXAM: XR ANKLE RIGHT G/E 3 VIEWS  LOCATION: [facility]  DATE: 6/15/2025    INDICATION: inversion injury  COMPARISON: None.      Impression    IMPRESSION: Normal joint spaces and alignment. No fracture. Intact ankle mortise.      Medications - No data to display  Labs Ordered and Resulted from Time of ED Arrival to Time of ED Departure - No data to display  No orders to display          Critical  care was not performed.     Medical Decision Making  The patient's presentation was of moderate complexity (an acute complicated injury).    The patient's evaluation involved:  ordering and/or review of 1 test(s) in this encounter (see separate area of note for details)    The patient's management necessitated only low risk treatment.    Assessment & Plan    Patient here with ankle injury.  She likely has ankle sprain.  No laxity to the joint, Achilles and tendon intact on exam.  Limb is otherwise neurovascularly intact.  She is given an ankle stirrup and crutches due to inability to bear weight.  Discussed Tylenol, Profen, ice and elevation.    I have reviewed the nursing notes. I have reviewed the findings, diagnosis, plan and need for follow up with the patient.    New Prescriptions    No medications on file       Final diagnoses:   Sprain of right ankle, unspecified ligament, initial encounter       Ruben Gonzalez MD    [facility] EMERGENCY DEPARTMENT  6/15/2025     Ruben Gonzalez MD  06/15/25 1210

## 2025-06-15 NOTE — DISCHARGE INSTRUCTIONS
Please make an appointment to follow up with Sports Medicine (phone: [phone]) in 3-5 days if not improving.    If Anjelica has discomfort from fever or other pain, she can have:  Acetaminophen (Tylenol) every 6 hours as needed. Her dose is:    2 regular strength tabs (650 mg)                                     (43.2+ kg/96+ lb)    NOTE: If your acetaminophen (Tylenol) came with a dropper marked with 0.4 and 0.8 ml, call us ([phone]) or check with your doctor about the dose before using it.     AND/OR      Ibuprofen (Advil, Motrin) every 6 hours as needed. His/her dose is:    2 regular strength tabs (400 mg)                                                                         (40-60 kg/ lb)

## (undated) RX ORDER — PHENYLEPHRINE HCL IN 0.9% NACL 50MG/250ML
PLASTIC BAG, INJECTION (ML) INTRAVENOUS
Status: DISPENSED
Start: 2024-03-12

## (undated) RX ORDER — OXYTOCIN/0.9 % SODIUM CHLORIDE 30/500 ML
PLASTIC BAG, INJECTION (ML) INTRAVENOUS
Status: DISPENSED
Start: 2024-03-12

## (undated) RX ORDER — LIDOCAINE HCL/EPINEPHRINE/PF 2%-1:200K
VIAL (ML) INJECTION
Status: DISPENSED
Start: 2024-03-12